# Patient Record
Sex: FEMALE | Race: WHITE | Employment: FULL TIME | URBAN - NONMETROPOLITAN AREA
[De-identification: names, ages, dates, MRNs, and addresses within clinical notes are randomized per-mention and may not be internally consistent; named-entity substitution may affect disease eponyms.]

---

## 2016-08-26 LAB
CHOLESTEROL, TOTAL: 132 MG/DL
CHOLESTEROL/HDL RATIO: 2.5
HDLC SERPL-MCNC: 52 MG/DL (ref 35–70)
LDL CHOLESTEROL CALCULATED: 61.8 MG/DL (ref 0–160)
TRIGL SERPL-MCNC: 91 MG/DL
VLDLC SERPL CALC-MCNC: 18 MG/DL

## 2016-09-02 LAB — HBA1C MFR BLD: 5.9 %

## 2017-02-27 LAB
BUN BLDV-MCNC: 23 MG/DL
CALCIUM SERPL-MCNC: 10.3 MG/DL
CHLORIDE BLD-SCNC: 102 MMOL/L
CO2: 27 MMOL/L
CREAT SERPL-MCNC: 1.1 MG/DL
GFR CALCULATED: NORMAL
GLUCOSE BLD-MCNC: 146 MG/DL
POTASSIUM SERPL-SCNC: 4.6 MMOL/L
SODIUM BLD-SCNC: 141 MMOL/L

## 2017-05-09 VITALS
WEIGHT: 134 LBS | SYSTOLIC BLOOD PRESSURE: 128 MMHG | HEART RATE: 72 BPM | DIASTOLIC BLOOD PRESSURE: 80 MMHG | BODY MASS INDEX: 26.31 KG/M2 | HEIGHT: 60 IN

## 2017-05-09 DIAGNOSIS — E11.9 TYPE 2 DIABETES MELLITUS WITHOUT COMPLICATION, UNSPECIFIED LONG TERM INSULIN USE STATUS: ICD-10-CM

## 2017-05-09 DIAGNOSIS — I10 ESSENTIAL HYPERTENSION: ICD-10-CM

## 2017-05-09 DIAGNOSIS — E78.2 MIXED HYPERLIPIDEMIA: ICD-10-CM

## 2017-05-09 RX ORDER — LISINOPRIL AND HYDROCHLOROTHIAZIDE 20; 12.5 MG/1; MG/1
1 TABLET ORAL DAILY
COMMUNITY
End: 2017-11-06 | Stop reason: SDUPTHER

## 2017-05-09 RX ORDER — MELOXICAM 15 MG/1
15 TABLET ORAL DAILY
COMMUNITY
End: 2017-06-14 | Stop reason: SDUPTHER

## 2017-05-09 RX ORDER — LORATADINE 10 MG/1
10 TABLET ORAL DAILY
COMMUNITY
End: 2020-10-29

## 2017-05-09 RX ORDER — METFORMIN HYDROCHLORIDE 750 MG/1
750 TABLET, EXTENDED RELEASE ORAL 2 TIMES DAILY
COMMUNITY
End: 2017-07-05 | Stop reason: SDUPTHER

## 2017-05-09 RX ORDER — SIMVASTATIN 40 MG
40 TABLET ORAL NIGHTLY
COMMUNITY
End: 2017-07-24 | Stop reason: SDUPTHER

## 2017-05-09 RX ORDER — OMEPRAZOLE 40 MG/1
40 CAPSULE, DELAYED RELEASE ORAL DAILY
COMMUNITY
End: 2017-05-22 | Stop reason: SDUPTHER

## 2017-05-21 DIAGNOSIS — E11.9 TYPE 2 DIABETES MELLITUS WITHOUT COMPLICATION, WITH LONG-TERM CURRENT USE OF INSULIN (HCC): ICD-10-CM

## 2017-05-21 DIAGNOSIS — E78.2 MIXED HYPERLIPIDEMIA: ICD-10-CM

## 2017-05-21 DIAGNOSIS — Z79.4 TYPE 2 DIABETES MELLITUS WITHOUT COMPLICATION, WITH LONG-TERM CURRENT USE OF INSULIN (HCC): ICD-10-CM

## 2017-05-21 DIAGNOSIS — I10 ESSENTIAL HYPERTENSION: Primary | ICD-10-CM

## 2017-05-22 ENCOUNTER — OFFICE VISIT (OUTPATIENT)
Dept: FAMILY MEDICINE CLINIC | Age: 62
End: 2017-05-22
Payer: COMMERCIAL

## 2017-05-22 VITALS
SYSTOLIC BLOOD PRESSURE: 130 MMHG | WEIGHT: 137 LBS | HEIGHT: 60 IN | DIASTOLIC BLOOD PRESSURE: 88 MMHG | HEART RATE: 68 BPM | BODY MASS INDEX: 26.9 KG/M2

## 2017-05-22 DIAGNOSIS — E78.2 MIXED HYPERLIPIDEMIA: ICD-10-CM

## 2017-05-22 DIAGNOSIS — I10 ESSENTIAL HYPERTENSION: ICD-10-CM

## 2017-05-22 DIAGNOSIS — Z79.4 TYPE 2 DIABETES MELLITUS WITHOUT COMPLICATION, WITH LONG-TERM CURRENT USE OF INSULIN (HCC): Primary | ICD-10-CM

## 2017-05-22 DIAGNOSIS — E11.9 TYPE 2 DIABETES MELLITUS WITHOUT COMPLICATION, WITH LONG-TERM CURRENT USE OF INSULIN (HCC): Primary | ICD-10-CM

## 2017-05-22 DIAGNOSIS — E78.00 PURE HYPERCHOLESTEROLEMIA: Primary | ICD-10-CM

## 2017-05-22 LAB — HBA1C MFR BLD: 5.8 %

## 2017-05-22 PROCEDURE — G8427 DOCREV CUR MEDS BY ELIG CLIN: HCPCS | Performed by: FAMILY MEDICINE

## 2017-05-22 PROCEDURE — 99214 OFFICE O/P EST MOD 30 MIN: CPT | Performed by: FAMILY MEDICINE

## 2017-05-22 PROCEDURE — 3014F SCREEN MAMMO DOC REV: CPT | Performed by: FAMILY MEDICINE

## 2017-05-22 PROCEDURE — 83036 HEMOGLOBIN GLYCOSYLATED A1C: CPT | Performed by: FAMILY MEDICINE

## 2017-05-22 PROCEDURE — 3017F COLORECTAL CA SCREEN DOC REV: CPT | Performed by: FAMILY MEDICINE

## 2017-05-22 PROCEDURE — 3044F HG A1C LEVEL LT 7.0%: CPT | Performed by: FAMILY MEDICINE

## 2017-05-22 PROCEDURE — 1036F TOBACCO NON-USER: CPT | Performed by: FAMILY MEDICINE

## 2017-05-22 PROCEDURE — G8419 CALC BMI OUT NRM PARAM NOF/U: HCPCS | Performed by: FAMILY MEDICINE

## 2017-05-22 RX ORDER — OMEPRAZOLE 40 MG/1
40 CAPSULE, DELAYED RELEASE ORAL DAILY
Qty: 90 CAPSULE | Refills: 3 | Status: SHIPPED | OUTPATIENT
Start: 2017-05-22 | End: 2018-05-18 | Stop reason: SDUPTHER

## 2017-05-22 RX ORDER — MAGNESIUM OXIDE 400 MG/1
400 TABLET ORAL DAILY
COMMUNITY
End: 2021-01-20 | Stop reason: ALTCHOICE

## 2017-05-22 ASSESSMENT — PATIENT HEALTH QUESTIONNAIRE - PHQ9
SUM OF ALL RESPONSES TO PHQ9 QUESTIONS 1 & 2: 0
1. LITTLE INTEREST OR PLEASURE IN DOING THINGS: 0
2. FEELING DOWN, DEPRESSED OR HOPELESS: 0
SUM OF ALL RESPONSES TO PHQ QUESTIONS 1-9: 0

## 2017-05-23 ASSESSMENT — ENCOUNTER SYMPTOMS
BLOOD IN STOOL: 0
ABDOMINAL PAIN: 0
WHEEZING: 0
DIARRHEA: 0
CONSTIPATION: 0
SHORTNESS OF BREATH: 0
CHEST TIGHTNESS: 0

## 2017-06-14 RX ORDER — MELOXICAM 15 MG/1
TABLET ORAL
Qty: 90 TABLET | Refills: 3 | Status: SHIPPED | OUTPATIENT
Start: 2017-06-14 | End: 2018-06-09 | Stop reason: SDUPTHER

## 2017-07-05 RX ORDER — METFORMIN HYDROCHLORIDE 750 MG/1
TABLET, EXTENDED RELEASE ORAL
Qty: 180 TABLET | Refills: 3 | Status: SHIPPED | OUTPATIENT
Start: 2017-07-05 | End: 2018-06-27 | Stop reason: SDUPTHER

## 2017-07-20 RX ORDER — PEN NEEDLE, DIABETIC 31 GX5/16"
NEEDLE, DISPOSABLE MISCELLANEOUS
Qty: 90 EACH | Refills: 1 | Status: SHIPPED | OUTPATIENT
Start: 2017-07-20 | End: 2018-04-11 | Stop reason: SDUPTHER

## 2017-07-20 RX ORDER — INSULIN DETEMIR 100 [IU]/ML
INJECTION, SOLUTION SUBCUTANEOUS
Qty: 15 ML | Refills: 1 | Status: SHIPPED | OUTPATIENT
Start: 2017-07-20 | End: 2018-04-11 | Stop reason: SDUPTHER

## 2017-07-24 DIAGNOSIS — E78.00 PURE HYPERCHOLESTEROLEMIA: Primary | ICD-10-CM

## 2017-07-25 RX ORDER — SIMVASTATIN 40 MG
40 TABLET ORAL NIGHTLY
Qty: 90 TABLET | Refills: 3 | Status: SHIPPED | OUTPATIENT
Start: 2017-07-25 | End: 2018-07-20 | Stop reason: SDUPTHER

## 2017-11-06 RX ORDER — LISINOPRIL AND HYDROCHLOROTHIAZIDE 20; 12.5 MG/1; MG/1
TABLET ORAL
Qty: 90 TABLET | Refills: 2 | Status: SHIPPED | OUTPATIENT
Start: 2017-11-06 | End: 2018-08-03 | Stop reason: SDUPTHER

## 2017-11-06 NOTE — TELEPHONE ENCOUNTER
Raf Richard is calling to request a refill on the following medication(s):  Requested Prescriptions     Pending Prescriptions Disp Refills    lisinopril-hydrochlorothiazide (PRINZIDE;ZESTORETIC) 20-12.5 MG per tablet [Pharmacy Med Name: LISINOPRIL/HCTZ TABS 20/12.5MG] 90 tablet 2     Sig: TAKE 1 TABLET DAILY       Last Visit Date (If Applicable):  8/56/2655    Next Visit Date:    11/17/2017

## 2017-11-15 LAB
CREATININE, RANDOM: 214.9 MG/DL
MICROALBUMIN UR-MCNC: 3.9 MG/DL
MICROALBUMIN/CREAT UR-RTO: 18.1 MCG/MG CR

## 2017-11-16 DIAGNOSIS — Z79.4 TYPE 2 DIABETES MELLITUS WITHOUT COMPLICATION, WITH LONG-TERM CURRENT USE OF INSULIN (HCC): ICD-10-CM

## 2017-11-16 DIAGNOSIS — E11.9 TYPE 2 DIABETES MELLITUS WITHOUT COMPLICATION, WITH LONG-TERM CURRENT USE OF INSULIN (HCC): ICD-10-CM

## 2017-11-17 ENCOUNTER — OFFICE VISIT (OUTPATIENT)
Dept: FAMILY MEDICINE CLINIC | Age: 62
End: 2017-11-17
Payer: COMMERCIAL

## 2017-11-17 VITALS
BODY MASS INDEX: 27.6 KG/M2 | WEIGHT: 139 LBS | HEART RATE: 80 BPM | SYSTOLIC BLOOD PRESSURE: 130 MMHG | DIASTOLIC BLOOD PRESSURE: 70 MMHG

## 2017-11-17 DIAGNOSIS — Z12.12 SCREENING FOR COLORECTAL CANCER: ICD-10-CM

## 2017-11-17 DIAGNOSIS — I10 ESSENTIAL HYPERTENSION: ICD-10-CM

## 2017-11-17 DIAGNOSIS — Z11.59 NEED FOR HEPATITIS C SCREENING TEST: ICD-10-CM

## 2017-11-17 DIAGNOSIS — E78.2 MIXED HYPERLIPIDEMIA: ICD-10-CM

## 2017-11-17 DIAGNOSIS — Z12.11 SCREENING FOR COLORECTAL CANCER: ICD-10-CM

## 2017-11-17 DIAGNOSIS — E11.8 TYPE 2 DIABETES MELLITUS WITH COMPLICATION, UNSPECIFIED LONG TERM INSULIN USE STATUS: Primary | ICD-10-CM

## 2017-11-17 LAB — HBA1C MFR BLD: 5.8 %

## 2017-11-17 PROCEDURE — 3017F COLORECTAL CA SCREEN DOC REV: CPT | Performed by: FAMILY MEDICINE

## 2017-11-17 PROCEDURE — 3014F SCREEN MAMMO DOC REV: CPT | Performed by: FAMILY MEDICINE

## 2017-11-17 PROCEDURE — G8484 FLU IMMUNIZE NO ADMIN: HCPCS | Performed by: FAMILY MEDICINE

## 2017-11-17 PROCEDURE — 99214 OFFICE O/P EST MOD 30 MIN: CPT | Performed by: FAMILY MEDICINE

## 2017-11-17 PROCEDURE — G8427 DOCREV CUR MEDS BY ELIG CLIN: HCPCS | Performed by: FAMILY MEDICINE

## 2017-11-17 PROCEDURE — 1036F TOBACCO NON-USER: CPT | Performed by: FAMILY MEDICINE

## 2017-11-17 PROCEDURE — 83036 HEMOGLOBIN GLYCOSYLATED A1C: CPT | Performed by: FAMILY MEDICINE

## 2017-11-17 PROCEDURE — G8417 CALC BMI ABV UP PARAM F/U: HCPCS | Performed by: FAMILY MEDICINE

## 2017-11-17 PROCEDURE — 3044F HG A1C LEVEL LT 7.0%: CPT | Performed by: FAMILY MEDICINE

## 2017-11-17 ASSESSMENT — ENCOUNTER SYMPTOMS
SHORTNESS OF BREATH: 0
DIARRHEA: 0
BLOOD IN STOOL: 0
ABDOMINAL PAIN: 0
CONSTIPATION: 0
CHEST TIGHTNESS: 0
WHEEZING: 0

## 2017-11-17 NOTE — PROGRESS NOTES
[address]  Dept: [phone]  Dept Fax: [phone]    Deja Gil is a 58 y.o. female who presents today for her medical conditions/complaints as noted below. Deja Gil is c/o of Hypertension (not checking BP, denies chest pain, palpatations, leg swelling, s.o.b); Diabetes (rarely checks BS, taking all meds as directed. denies numbness in feet); and Hyperlipidemia (recent leg cramps, seems worse now, not necessarily r/t zocor. cramps in thighs)      HPI:     HPI   Will be getting HRA labs   Diabetes  Doing well      Checking blood sugars   [x]   none    Meds   []   none Bydureon, levemir, metformin   Last hemoglobin A1C   []   none 5.8   Last eye exam   []   none A year ago with Dr Luz Marina Escudero   microalbumin   []   none Normal    Last diabetic foot exam   []   none    Endocrinologist    [x]   none    Hypoglycemic episodes    [x]   none    Complications   []   none     CAD    []   none     Hypertension  well controlled; BP: 130/70   No problems with medication side effects; tolerating well  No chest pain  No edema   Hyperlipidemia   Well controlled  Medication side effects - ? Myalgias   The 10-year CVD risk score (D'Agostino, et al., 2008) is: 12.4%    Values used to calculate the score:      Age: 58 years      Sex: Female      Diabetic: Yes      Tobacco smoker: No      Systolic Blood Pressure: 494 mmHg      Is BP treated: Yes      HDL Cholesterol: 52 mg/dL      Total Cholesterol: 132 mg/dL   Chest pain - none  Claudication - none        BP Readings from Last 3 Encounters:   11/17/17 130/70   05/22/17 130/88   09/02/16 128/80            (goal 120/80)    Past Medical History:   Diagnosis Date    Hyperlipidemia     Hypertension     Nephrolithiasis     Type 2 diabetes mellitus without complication ([facility] [address]       Past Surgical History:   Procedure Laterality Date    CHOLECYSTECTOMY       No family history on file.   Social Pneumococcal med risk  Completed       Subjective:      Review of Systems   Constitutional: Negative for appetite change, chills, fever and unexpected weight change. Respiratory: Negative for chest tightness, shortness of breath and wheezing. Cardiovascular: Negative for chest pain, palpitations and leg swelling. Gastrointestinal: Negative for abdominal pain, blood in stool, constipation and diarrhea. Genitourinary: Negative for dysuria, hematuria and urgency. Objective:     /70   Pulse 80   Wt 139 lb (63 kg)   BMI 27.60 kg/m²     Physical Exam   Constitutional: She appears well-developed and well-nourished. HENT:   Head: Normocephalic. Right Ear: Tympanic membrane normal.   Left Ear: Tympanic membrane normal.   Neck: Neck supple. No thyromegaly present. Cardiovascular: Normal rate, regular rhythm, S1 normal and S2 normal.    Pulmonary/Chest: Breath sounds normal. She has no wheezes. She has no rales. She exhibits no tenderness. Abdominal: Soft. She exhibits no abdominal bruit and no mass. There is no hepatosplenomegaly. There is no tenderness. There is no guarding. Musculoskeletal: She exhibits no edema. Lymphadenopathy:     She has no cervical adenopathy. She has no axillary adenopathy. Neurological: No cranial nerve deficit. Diabetic foot exam - normal strength; intact sensation to monofilament; normal pulses    Assessment:     1. Type 2 diabetes mellitus with complication, unspecified long term insulin use status (HCC)  POCT glycosylated hemoglobin (Hb A1C)     DIABETES FOOT EXAM   2. Need for hepatitis C screening test  Hepatitis C Antibody   3. Mixed hyperlipidemia     4. Essential hypertension     5.  Screening for colorectal cancer  External Referral To General Surgery            POC Testing Results (If Applicable):  Results for POC orders placed in visit on 11/17/17   POCT glycosylated hemoglobin (Hb A1C)   Result Value Ref Range    Hemoglobin A1C 5.8 % Plan:     Continue current meds   Will get screening colonoscopy  Will get hepatitis C screen     Orders Given:  Orders Placed This Encounter   Procedures    Hepatitis C Antibody     Standing Status:   Future     Standing Expiration Date:   11/17/2018    External Referral To General Surgery     Referral Priority:   Routine     Referral Type:   Consult for Advice and Opinion     Referral Reason:   Specialty Services Required     Referred to Provider:   Sherwin Palma MD     Requested Specialty:   General Surgery     Number of Visits Requested:   1    POCT glycosylated hemoglobin (Hb A1C)    HM DIABETES FOOT EXAM          Electronically signed by Kalpesh Gilman MD on 11/17/2017.

## 2018-05-18 DIAGNOSIS — E78.00 PURE HYPERCHOLESTEROLEMIA: ICD-10-CM

## 2018-05-18 RX ORDER — OMEPRAZOLE 40 MG/1
CAPSULE, DELAYED RELEASE ORAL
Qty: 90 CAPSULE | Refills: 3 | Status: SHIPPED | OUTPATIENT
Start: 2018-05-18 | End: 2019-05-13 | Stop reason: SDUPTHER

## 2018-06-11 RX ORDER — MELOXICAM 15 MG/1
TABLET ORAL
Qty: 90 TABLET | Refills: 3 | Status: SHIPPED | OUTPATIENT
Start: 2018-06-11 | End: 2019-06-06 | Stop reason: SDUPTHER

## 2018-06-29 RX ORDER — METFORMIN HYDROCHLORIDE 750 MG/1
TABLET, EXTENDED RELEASE ORAL
Qty: 180 TABLET | Refills: 3 | Status: SHIPPED | OUTPATIENT
Start: 2018-06-29 | End: 2019-06-24 | Stop reason: SDUPTHER

## 2018-07-20 DIAGNOSIS — E78.00 PURE HYPERCHOLESTEROLEMIA: ICD-10-CM

## 2018-07-20 RX ORDER — SIMVASTATIN 40 MG
TABLET ORAL
Qty: 90 TABLET | Refills: 3 | Status: SHIPPED | OUTPATIENT
Start: 2018-07-20 | End: 2019-07-15 | Stop reason: SDUPTHER

## 2018-08-03 DIAGNOSIS — I10 ESSENTIAL HYPERTENSION: Primary | ICD-10-CM

## 2018-08-03 RX ORDER — LISINOPRIL AND HYDROCHLOROTHIAZIDE 20; 12.5 MG/1; MG/1
TABLET ORAL
Qty: 90 TABLET | Refills: 2 | Status: SHIPPED | OUTPATIENT
Start: 2018-08-03 | End: 2019-05-07 | Stop reason: SDUPTHER

## 2018-12-28 RX ORDER — INSULIN DETEMIR 100 [IU]/ML
INJECTION, SOLUTION SUBCUTANEOUS
Qty: 15 ML | Refills: 1 | Status: SHIPPED | OUTPATIENT
Start: 2018-12-28 | End: 2019-09-18 | Stop reason: SDUPTHER

## 2019-03-25 DIAGNOSIS — Z79.4 TYPE 2 DIABETES MELLITUS WITHOUT COMPLICATION, WITH LONG-TERM CURRENT USE OF INSULIN (HCC): Primary | ICD-10-CM

## 2019-03-25 DIAGNOSIS — E11.9 TYPE 2 DIABETES MELLITUS WITHOUT COMPLICATION, WITH LONG-TERM CURRENT USE OF INSULIN (HCC): Primary | ICD-10-CM

## 2019-03-26 RX ORDER — EXENATIDE 2 MG/.65ML
INJECTION, SUSPENSION, EXTENDED RELEASE SUBCUTANEOUS
Qty: 12 EACH | Refills: 3 | Status: SHIPPED | OUTPATIENT
Start: 2019-03-26 | End: 2020-03-09

## 2019-05-07 ENCOUNTER — OFFICE VISIT (OUTPATIENT)
Dept: FAMILY MEDICINE CLINIC | Age: 64
End: 2019-05-07
Payer: COMMERCIAL

## 2019-05-07 VITALS
SYSTOLIC BLOOD PRESSURE: 136 MMHG | HEART RATE: 72 BPM | DIASTOLIC BLOOD PRESSURE: 72 MMHG | BODY MASS INDEX: 27.09 KG/M2 | OXYGEN SATURATION: 98 % | WEIGHT: 138 LBS | HEIGHT: 60 IN

## 2019-05-07 DIAGNOSIS — Z12.31 SCREENING MAMMOGRAM, ENCOUNTER FOR: ICD-10-CM

## 2019-05-07 DIAGNOSIS — I10 ESSENTIAL HYPERTENSION: ICD-10-CM

## 2019-05-07 DIAGNOSIS — D50.8 OTHER IRON DEFICIENCY ANEMIA: ICD-10-CM

## 2019-05-07 DIAGNOSIS — Z79.4 TYPE 2 DIABETES MELLITUS WITHOUT COMPLICATION, WITH LONG-TERM CURRENT USE OF INSULIN (HCC): Primary | ICD-10-CM

## 2019-05-07 DIAGNOSIS — E78.2 MIXED HYPERLIPIDEMIA: ICD-10-CM

## 2019-05-07 DIAGNOSIS — E11.9 TYPE 2 DIABETES MELLITUS WITHOUT COMPLICATION, WITH LONG-TERM CURRENT USE OF INSULIN (HCC): Primary | ICD-10-CM

## 2019-05-07 DIAGNOSIS — Z78.0 ASYMPTOMATIC MENOPAUSAL STATE: ICD-10-CM

## 2019-05-07 LAB — HBA1C MFR BLD: 5.9 %

## 2019-05-07 PROCEDURE — 3044F HG A1C LEVEL LT 7.0%: CPT | Performed by: FAMILY MEDICINE

## 2019-05-07 PROCEDURE — 83036 HEMOGLOBIN GLYCOSYLATED A1C: CPT | Performed by: FAMILY MEDICINE

## 2019-05-07 PROCEDURE — 1036F TOBACCO NON-USER: CPT | Performed by: FAMILY MEDICINE

## 2019-05-07 PROCEDURE — G8419 CALC BMI OUT NRM PARAM NOF/U: HCPCS | Performed by: FAMILY MEDICINE

## 2019-05-07 PROCEDURE — 2022F DILAT RTA XM EVC RTNOPTHY: CPT | Performed by: FAMILY MEDICINE

## 2019-05-07 PROCEDURE — G8427 DOCREV CUR MEDS BY ELIG CLIN: HCPCS | Performed by: FAMILY MEDICINE

## 2019-05-07 PROCEDURE — 3017F COLORECTAL CA SCREEN DOC REV: CPT | Performed by: FAMILY MEDICINE

## 2019-05-07 PROCEDURE — 99214 OFFICE O/P EST MOD 30 MIN: CPT | Performed by: FAMILY MEDICINE

## 2019-05-07 RX ORDER — LISINOPRIL AND HYDROCHLOROTHIAZIDE 20; 12.5 MG/1; MG/1
1 TABLET ORAL DAILY
Qty: 90 TABLET | Refills: 3 | Status: SHIPPED | OUTPATIENT
Start: 2019-05-07 | End: 2020-05-01

## 2019-05-07 ASSESSMENT — PATIENT HEALTH QUESTIONNAIRE - PHQ9
SUM OF ALL RESPONSES TO PHQ QUESTIONS 1-9: 0
1. LITTLE INTEREST OR PLEASURE IN DOING THINGS: 0
2. FEELING DOWN, DEPRESSED OR HOPELESS: 0
SUM OF ALL RESPONSES TO PHQ QUESTIONS 1-9: 0
1. LITTLE INTEREST OR PLEASURE IN DOING THINGS: 0
SUM OF ALL RESPONSES TO PHQ9 QUESTIONS 1 & 2: 0

## 2019-05-07 ASSESSMENT — ENCOUNTER SYMPTOMS
CONSTIPATION: 0
BLOOD IN STOOL: 0
ABDOMINAL PAIN: 0
DIARRHEA: 0
SHORTNESS OF BREATH: 0
COUGH: 0
WHEEZING: 0

## 2019-05-07 NOTE — PROGRESS NOTES
[address] [address] [address]  Dept: [phone]  DeptFax: [phone]    Greta Delatorre is a57 y.o. female who presents today for her medical conditions/complaints as noted below. Greta Delatorre is c/o of Hyperlipidemia (denies issues or complaints); Hypertension; and Diabetes (does not ck bs)      HPI:     HPI  Diabetes  Doing well      Checking blood sugars   [x]   none  hasn't been checking    Meds   []   none Bydureon, levemir, metformin   Last hemoglobin A1C   []   none 5.8; one year ago   Last eye exam   []   none Goes every year for eye exam with Dr Jenni Wasserman   microalbumin   []   none Normal    Last diabetic foot exam   []   none  none recent    Endocrinologist    [x]   none     Hypoglycemic episodes    [x]   none     Complications   []   none      CAD    []   none      Hypertension  well controlled; BP: 136/72   No problems with medication side effects; tolerating well  No chest pain  No edema   Remains on lisinopril HCTZ;; Cr 1.1 from hospital   Hyperlipidemia   Well controlled  Medication side effects ; none remains on zocor  No chest pain/pressure  Likes to walk   No recent lab    Health Maintenance   Colonoscopy - up to date   Mammogram - last   DEXA scan - last   Lipid profile - ordered  Glucose/diabetes screen - see above                       BP Readings from Last 3 Encounters:   19 136/72   17 130/70   17 130/88            (goal 120/80)    Past Medical History:   Diagnosis Date    Hyperlipidemia     Hypertension     Nephrolithiasis     Type 2 diabetes mellitus without complication ([facility] [address]       Past Surgical History:   Procedure Laterality Date    CHOLECYSTECTOMY       No family history on file.   Social History     Tobacco Use    Smoking status: Former Smoker     Packs/day: 0.50     Years: 4.00     Pack years: 2.00     Last attempt to quit: 1997     Years since quittin.9    Smokeless tobacco: Never Used   Substance Use Topics    Alcohol use: Yes        Current Outpatient Medications   Medication Sig Dispense Refill    BYDUREON 2 MG PEN INJECT CONTENTS OF  1 PEN (2 MG) UNDER THE SKIN ONCE A WEEK 12 each 3    LEVEMIR FLEXTOUCH 100 UNIT/ML injection pen INJECT 11 UNITS UNDER THE SKIN AT BEDTIME 15 mL 1    lisinopril-hydrochlorothiazide (PRINZIDE;ZESTORETIC) 20-12.5 MG per tablet TAKE 1 TABLET DAILY 90 tablet 2    simvastatin (ZOCOR) 40 MG tablet TAKE 1 TABLET NIGHTLY 90 tablet 3    metFORMIN (GLUCOPHAGE-XR) 750 MG extended release tablet TAKE 1 TABLET TWICE A  tablet 3    meloxicam (MOBIC) 15 MG tablet TAKE 1 TABLET DAILY 90 tablet 3    omeprazole (PRILOSEC) 40 MG delayed release capsule TAKE 1 CAPSULE DAILY 90 capsule 3    Insulin Pen Needle (B-D ULTRAFINE III SHORT PEN) 31G X 8 MM MISC USE UNDER THE SKIN DAILY 100 each 3    aspirin 81 MG tablet Take 81 mg by mouth daily      Multiple Vitamins-Minerals (MULTIVITAMIN & MINERAL PO) Take by mouth      magnesium oxide (MAG-OX) 400 MG tablet Take 400 mg by mouth daily      loratadine (CLARITIN) 10 MG tablet Take 10 mg by mouth daily       No current facility-administered medications for this visit.       No Known Allergies    Health Maintenance   Topic Date Due    Hepatitis C screen  1955    DTaP/Tdap/Td vaccine (1 - Tdap) 09/13/1974    Shingles Vaccine (1 of 2) 09/13/2005    Lipid screen  08/26/2017    Cervical cancer screen  09/30/2017    Potassium monitoring  02/27/2018    Creatinine monitoring  02/27/2018    Diabetic microalbuminuria test  11/15/2018    Diabetic foot exam  11/17/2018    A1C test (Diabetic or Prediabetic)  11/17/2018    Breast cancer screen  11/25/2018    HIV screen  11/17/2027 (Originally 9/13/1970)    Diabetic retinal exam  07/13/2019    Flu vaccine (Season Ended) 09/01/2019    Colon cancer screen colonoscopy  01/08/2028    Pneumococcal 0-64 years Vaccine  Completed       Lab Results Component Value Date    K 4.6 02/27/2017    CREATININE 1.1 02/27/2017    LABA1C 5.9 05/07/2019    GLUCOSE 146 02/27/2017    CALCIUM 10.3 02/27/2017      Lab Results   Component Value Date    CHOL 132 08/26/2016    TRIG 91 08/26/2016    HDL 52 08/26/2016       Subjective:      Review of Systems   Constitutional: Negative for appetite change, chills, fever and unexpected weight change. HENT: Negative. Eyes: Negative for visual disturbance. Respiratory: Negative for cough, shortness of breath and wheezing. Cardiovascular: Negative for chest pain, palpitations and leg swelling. Gastrointestinal: Negative for abdominal pain, blood in stool, constipation and diarrhea. Endocrine: Negative for polydipsia, polyphagia and polyuria. Genitourinary: Negative for dysuria, hematuria and urgency. Neurological: Negative for numbness. Psychiatric/Behavioral: Negative for dysphoric mood. Objective:     /72   Pulse 72   Ht 4' 11.5\" (1.511 m)   Wt 138 lb (62.6 kg)   SpO2 98%   BMI 27.41 kg/m²     Physical Exam   Constitutional: She appears well-developed and well-nourished. HENT:   Right Ear: Tympanic membrane normal.   Left Ear: Tympanic membrane normal.   Mouth/Throat: Oropharynx is clear and moist and mucous membranes are normal.   Neck: Neck supple. No thyromegaly present. Cardiovascular: Normal rate, regular rhythm, S1 normal and S2 normal.   Pulmonary/Chest: Breath sounds normal. She has no wheezes. She has no rales. She exhibits no tenderness. Abdominal: Soft. She exhibits no abdominal bruit and no mass. There is no hepatosplenomegaly. There is no tenderness. There is no guarding. Musculoskeletal: She exhibits no edema. Lymphadenopathy:     She has no cervical adenopathy. She has no axillary adenopathy. Neurological: No cranial nerve deficit.        Diabetic Foot Exam    Pulses:  normal,   Edema: negative  Skin: normal    Sensory exam  Monofilament Sensation:  normal  (minimum of 5 random plantar locations tested, avoid callous areas - > 1 area with absence of sensation is + for neuropathy)          Assessment:      Diagnosis Orders   1. Type 2 diabetes mellitus without complication, with long-term current use of insulin ([facility])   DIABETES FOOT EXAM    POCT glycosylated hemoglobin (Hb A1C)    Microalbumin, Ur    Comprehensive Metabolic Panel, Fasting   2. Essential hypertension     3. Mixed hyperlipidemia  Lipid Panel   4. Screening mammogram, encounter for  CHAD DIGITAL SCREEN W CAD BILATERAL   5. Asymptomatic menopausal state  DEXA AXIAL SKELETON W VERTEBRAL FX ASST   6. Other iron deficiency anemia  Iron And TIBC    Ferritin    Vitamin B12 & Folate            POC Testing Results (If Applicable):  Results for POC orders placed in visit on 05/07/19   POCT glycosylated hemoglobin (Hb A1C)   Result Value Ref Range    Hemoglobin A1C 5.9 %       Plan: Will get labs including her lipids and microalbumin. Check iron levels B-12 folate. Treat if necessary. Remembering that she did have a colonoscopy last year. We will also order her mammogram and bone density testing. Lisinopril hydrochlorothiazide is refilled.   Recheck in 6 months sooner if any problems    Orders Given:  Orders Placed This Encounter   Procedures    CHAD DIGITAL SCREEN W CAD BILATERAL     Standing Status:   Future     Standing Expiration Date:   7/6/2020    DEXA AXIAL SKELETON W VERTEBRAL FX ASST     Standing Status:   Future     Standing Expiration Date:   5/7/2020     Order Specific Question:   Reason for exam:     Answer:   screening    Lipid Panel     Standing Status:   Future     Standing Expiration Date:   5/6/2020     Order Specific Question:   Is Patient Fasting?/# of Hours     Answer:   Yes, 12 hours    Microalbumin, Ur     Standing Status:   Future     Standing Expiration Date:   5/6/2020    Comprehensive Metabolic Panel, Fasting     Standing Status:   Future     Standing Expiration Date:   5/6/2020   Lloyd Taylor Iron And TIBC     Standing Status:   Future     Standing Expiration Date:   5/7/2020     Order Specific Question:   Is Patient Fasting? Answer:   yes     Order Specific Question:   No of Hours? Answer:   12 hours    Ferritin     Standing Status:   Future     Standing Expiration Date:   5/7/2020    Vitamin B12 & Folate     Standing Status:   Future     Standing Expiration Date:   5/7/2020    POCT glycosylated hemoglobin (Hb A1C)    HM DIABETES FOOT EXAM     Prescriptions:    No orders of the defined types were placed in this encounter. No follow-ups on file. Electronically signed by Kenton Austin MD on5/7/2019. **This report has been created using voice recognition software. It may contain minor errors which are inherent in voice recognition technology. **

## 2019-05-13 DIAGNOSIS — E78.00 PURE HYPERCHOLESTEROLEMIA: ICD-10-CM

## 2019-05-13 RX ORDER — OMEPRAZOLE 40 MG/1
CAPSULE, DELAYED RELEASE ORAL
Qty: 90 CAPSULE | Refills: 3 | Status: SHIPPED | OUTPATIENT
Start: 2019-05-13 | End: 2020-05-08

## 2019-05-13 NOTE — TELEPHONE ENCOUNTER
Ada Hassan is calling to request a refill on the following medication(s):  Requested Prescriptions     Pending Prescriptions Disp Refills    omeprazole (PRILOSEC) 40 MG delayed release capsule [Pharmacy Med Name: OMEPRAZOLE DR CAPS 40MG] 90 capsule 3     Sig: TAKE 1 CAPSULE DAILY       Last Visit Date (If Applicable):  1/2/5279    Next Visit Date:    11/11/2019

## 2019-05-14 DIAGNOSIS — Z79.4 TYPE 2 DIABETES MELLITUS WITHOUT COMPLICATION, WITH LONG-TERM CURRENT USE OF INSULIN (HCC): Primary | ICD-10-CM

## 2019-05-14 DIAGNOSIS — E11.9 TYPE 2 DIABETES MELLITUS WITHOUT COMPLICATION, WITH LONG-TERM CURRENT USE OF INSULIN (HCC): Primary | ICD-10-CM

## 2019-05-15 NOTE — TELEPHONE ENCOUNTER
Bart Kay is calling to request a refill on the following medication(s):  Requested Prescriptions     Pending Prescriptions Disp Refills    Insulin Pen Needle (B-D ULTRAFINE III SHORT PEN) 31G X 8 MM MISC [Pharmacy Med Name: BD PEN CECILY UF SHORT 8MM 90S 31G5/16] 90 each 3     Sig: INJECT UNDER THE SKIN DAILY       Last Visit Date (If Applicable):  8/7/7969    Next Visit Date:    11/11/2019

## 2019-05-17 LAB
% SATURATION: 21 % (ref 15–38)
A/G RATIO: 1.7 RATIO
AGE FOR GFR: 63
ALBUMIN: 4.7 G/DL (ref 3.5–5)
ALK PHOSPHATASE: 62 UNITS/L (ref 38–126)
ALT SERPL-CCNC: 27 UNITS/L (ref 9–52)
ANION GAP SERPL CALCULATED.3IONS-SCNC: 14 MMOL/L
AST SERPL-CCNC: 26 UNITS/L (ref 14–36)
BILIRUB SERPL-MCNC: 0.5 MG/DL (ref 0.2–1.3)
BUN BLDV-MCNC: 23 MG/DL (ref 7–17)
CALCIUM SERPL-MCNC: 10.6 MG/DL (ref 8.4–10.2)
CHLORIDE BLD-SCNC: 100 MMOL/L (ref 98–120)
CHOLESTEROL/HDL RATIO: 2.8 RATIO (ref 0–4.5)
CHOLESTEROL: 152 MG/DL (ref 50–200)
CO2: 29 MMOL/L (ref 22–31)
CREAT SERPL-MCNC: 0.9 MG/DL (ref 0.5–1)
CREATININE, RANDOM: 129.4 MG/DL (ref 20–370)
EGFR BF: 77 ML/MIN/1.73 M2
EGFR BM: 103 ML/MIN/1.73 M2
EGFR WF: 63 ML/MIN/1.73 M2
EGFR WM: 85 ML/MIN/1.73 M2
FERRITIN: 9 NG/ML (ref 10–28)
FOLATE: >20 NG/ML (ref 2.8–20)
GLOBULIN: 2.8 G/DL
GLUCOSE: 106 MG/DL (ref 65–105)
HDL, DIRECT: 54 MG/DL (ref 36–68)
IRON: 86 MG/DL (ref 37–170)
LDL CHOLESTEROL CALCULATED: 77.8 MG/DL (ref 0–160)
MICROALBUMIN UR-MCNC: 2.2 MG/DL (ref 0–1.7)
MICROALBUMIN/CREAT UR-RTO: 17 MCG/MG CR
POTASSIUM SERPL-SCNC: 4.7 MMOL/L (ref 3.6–5)
SODIUM BLD-SCNC: 138 MMOL/L (ref 135–145)
TOTAL IRON BINDING CAPACITY: 418 MG/DL (ref 261–497)
TOTAL PROTEIN: 7.5 G/DL (ref 6.3–8.2)
TRIGL SERPL-MCNC: 101 MG/DL (ref 10–250)
VITAMIN B-12: 497 PG/ML (ref 239–931)
VLDLC SERPL CALC-MCNC: 20 MG/DL (ref 0–40)

## 2019-06-06 DIAGNOSIS — M19.90 ARTHRITIS: Primary | ICD-10-CM

## 2019-06-06 RX ORDER — MELOXICAM 15 MG/1
TABLET ORAL
Qty: 90 TABLET | Refills: 3 | Status: SHIPPED | OUTPATIENT
Start: 2019-06-06 | End: 2020-06-01 | Stop reason: SDUPTHER

## 2019-06-06 NOTE — TELEPHONE ENCOUNTER
Bran Mirza is requesting a refill on the following medication(s):  Requested Prescriptions     Pending Prescriptions Disp Refills    meloxicam (MOBIC) 15 MG tablet [Pharmacy Med Name: MELOXICAM TABS 15MG] 90 tablet 3     Sig: TAKE 1 TABLET DAILY       Last Visit Date (If Applicable):  9/8/7139    Next Visit Date:    11/11/2019

## 2019-06-24 DIAGNOSIS — E11.9 TYPE 2 DIABETES MELLITUS WITHOUT COMPLICATION, WITH LONG-TERM CURRENT USE OF INSULIN (HCC): Primary | ICD-10-CM

## 2019-06-24 DIAGNOSIS — Z79.4 TYPE 2 DIABETES MELLITUS WITHOUT COMPLICATION, WITH LONG-TERM CURRENT USE OF INSULIN (HCC): Primary | ICD-10-CM

## 2019-06-24 RX ORDER — METFORMIN HYDROCHLORIDE 750 MG/1
TABLET, EXTENDED RELEASE ORAL
Qty: 180 TABLET | Refills: 3 | Status: SHIPPED | OUTPATIENT
Start: 2019-06-24 | End: 2020-06-18 | Stop reason: SDUPTHER

## 2019-07-15 DIAGNOSIS — E78.00 PURE HYPERCHOLESTEROLEMIA: ICD-10-CM

## 2019-07-15 RX ORDER — SIMVASTATIN 40 MG
TABLET ORAL
Qty: 90 TABLET | Refills: 3 | Status: SHIPPED | OUTPATIENT
Start: 2019-07-15 | End: 2020-07-14 | Stop reason: SDUPTHER

## 2019-09-18 DIAGNOSIS — Z79.4 TYPE 2 DIABETES MELLITUS WITHOUT COMPLICATION, WITH LONG-TERM CURRENT USE OF INSULIN (HCC): Primary | ICD-10-CM

## 2019-09-18 DIAGNOSIS — E11.9 TYPE 2 DIABETES MELLITUS WITHOUT COMPLICATION, WITH LONG-TERM CURRENT USE OF INSULIN (HCC): Primary | ICD-10-CM

## 2019-09-18 RX ORDER — INSULIN DETEMIR 100 [IU]/ML
INJECTION, SOLUTION SUBCUTANEOUS
Qty: 15 ML | Refills: 2 | Status: SHIPPED | OUTPATIENT
Start: 2019-09-18 | End: 2020-10-09 | Stop reason: SDUPTHER

## 2019-09-18 NOTE — TELEPHONE ENCOUNTER
Sommer Lerner is calling to request a refill on the following medication(s):  Requested Prescriptions     Pending Prescriptions Disp Refills    LEVEMIR FLEXTOUCH 100 UNIT/ML injection pen [Pharmacy Med Name: Eli Tucker PEN 3ML 5'S 100U/ML] 15 mL 2     Sig: INJECT 11 UNITS UNDER THE SKIN AT BEDTIME       Last Visit Date (If Applicable):  7/9/6955    Next Visit Date:    11/11/2019

## 2019-10-22 LAB
CHOLESTEROL, TOTAL: 141 MG/DL
CHOLESTEROL/HDL RATIO: 3.4
GLUCOSE FASTING: 100 MG/DL
HDLC SERPL-MCNC: 41 MG/DL (ref 35–70)
LDL CHOLESTEROL CALCULATED: 66.8 MG/DL (ref 0–160)
TRIGL SERPL-MCNC: 166 MG/DL
VLDLC SERPL CALC-MCNC: 33 MG/DL

## 2020-02-20 ENCOUNTER — TELEPHONE (OUTPATIENT)
Dept: FAMILY MEDICINE CLINIC | Age: 65
End: 2020-02-20

## 2020-03-09 RX ORDER — EXENATIDE 2 MG/.65ML
INJECTION, SUSPENSION, EXTENDED RELEASE SUBCUTANEOUS
Qty: 12 EACH | Refills: 3 | Status: SHIPPED | OUTPATIENT
Start: 2020-03-09 | End: 2021-02-01 | Stop reason: SDUPTHER

## 2020-03-10 ENCOUNTER — HOSPITAL ENCOUNTER (OUTPATIENT)
Age: 65
Setting detail: SPECIMEN
Discharge: HOME | End: 2020-03-10
Payer: COMMERCIAL

## 2020-03-10 ENCOUNTER — OFFICE VISIT (OUTPATIENT)
Dept: FAMILY MEDICINE CLINIC | Age: 65
End: 2020-03-10
Payer: COMMERCIAL

## 2020-03-10 VITALS
HEART RATE: 79 BPM | BODY MASS INDEX: 29.19 KG/M2 | OXYGEN SATURATION: 95 % | DIASTOLIC BLOOD PRESSURE: 80 MMHG | WEIGHT: 147 LBS | SYSTOLIC BLOOD PRESSURE: 134 MMHG

## 2020-03-10 LAB
ABSOLUTE EOS #: 0.34 K/UL (ref 0–0.44)
ABSOLUTE IMMATURE GRANULOCYTE: 0.03 K/UL (ref 0–0.3)
ABSOLUTE LYMPH #: 2.65 K/UL (ref 1.1–3.7)
ABSOLUTE MONO #: 0.79 K/UL (ref 0.1–1.2)
ALBUMIN SERPL-MCNC: 4.8 G/DL (ref 3.5–5.2)
ALBUMIN/GLOBULIN RATIO: 1.7 (ref 1–2.5)
ALP BLD-CCNC: 83 U/L (ref 35–104)
ALT SERPL-CCNC: 18 U/L (ref 5–33)
ANION GAP SERPL CALCULATED.3IONS-SCNC: 15 MMOL/L (ref 9–17)
AST SERPL-CCNC: 22 U/L
BASOPHILS # BLD: 1 % (ref 0–2)
BASOPHILS ABSOLUTE: 0.05 K/UL (ref 0–0.2)
BILIRUB SERPL-MCNC: 0.2 MG/DL (ref 0.3–1.2)
BUN BLDV-MCNC: 24 MG/DL (ref 8–23)
BUN/CREAT BLD: 22 (ref 9–20)
CALCIUM SERPL-MCNC: 10 MG/DL (ref 8.6–10.4)
CHLORIDE BLD-SCNC: 99 MMOL/L (ref 98–107)
CO2: 25 MMOL/L (ref 20–31)
CREAT SERPL-MCNC: 1.07 MG/DL (ref 0.5–0.9)
CREATININE URINE: 95.8 MG/DL (ref 28–217)
DIFFERENTIAL TYPE: ABNORMAL
EOSINOPHILS RELATIVE PERCENT: 3 % (ref 1–4)
GFR AFRICAN AMERICAN: >60 ML/MIN
GFR NON-AFRICAN AMERICAN: 52 ML/MIN
GFR SERPL CREATININE-BSD FRML MDRD: ABNORMAL ML/MIN/{1.73_M2}
GFR SERPL CREATININE-BSD FRML MDRD: ABNORMAL ML/MIN/{1.73_M2}
GLUCOSE FASTING: 133 MG/DL (ref 70–99)
HBA1C MFR BLD: 7.1 %
HCT VFR BLD CALC: 34.6 % (ref 36.3–47.1)
HEMOGLOBIN: 11.3 G/DL (ref 11.9–15.1)
IMMATURE GRANULOCYTES: 0 %
LYMPHOCYTES # BLD: 26 % (ref 24–43)
MCH RBC QN AUTO: 28.8 PG (ref 25.2–33.5)
MCHC RBC AUTO-ENTMCNC: 32.7 G/DL (ref 25.2–33.5)
MCV RBC AUTO: 88 FL (ref 82.6–102.9)
MICROALBUMIN/CREAT 24H UR: 14 MG/L
MICROALBUMIN/CREAT UR-RTO: 15 MCG/MG CREAT
MONOCYTES # BLD: 8 % (ref 3–12)
NRBC AUTOMATED: 0 PER 100 WBC
PDW BLD-RTO: 13.4 % (ref 11.8–14.4)
PLATELET # BLD: 341 K/UL (ref 138–453)
PLATELET ESTIMATE: ABNORMAL
PMV BLD AUTO: 12.1 FL (ref 8.1–13.5)
POTASSIUM SERPL-SCNC: 4.1 MMOL/L (ref 3.7–5.3)
RBC # BLD: 3.93 M/UL (ref 3.95–5.11)
RBC # BLD: ABNORMAL 10*6/UL
SEG NEUTROPHILS: 62 % (ref 36–65)
SEGMENTED NEUTROPHILS ABSOLUTE COUNT: 6.41 K/UL (ref 1.5–8.1)
SODIUM BLD-SCNC: 139 MMOL/L (ref 135–144)
TOTAL PROTEIN: 7.7 G/DL (ref 6.4–8.3)
WBC # BLD: 10.3 K/UL (ref 3.5–11.3)
WBC # BLD: ABNORMAL 10*3/UL

## 2020-03-10 PROCEDURE — 82728 ASSAY OF FERRITIN: CPT

## 2020-03-10 PROCEDURE — 83550 IRON BINDING TEST: CPT

## 2020-03-10 PROCEDURE — 83036 HEMOGLOBIN GLYCOSYLATED A1C: CPT | Performed by: FAMILY MEDICINE

## 2020-03-10 PROCEDURE — 99214 OFFICE O/P EST MOD 30 MIN: CPT | Performed by: FAMILY MEDICINE

## 2020-03-10 PROCEDURE — 82043 UR ALBUMIN QUANTITATIVE: CPT

## 2020-03-10 PROCEDURE — 85025 COMPLETE CBC W/AUTO DIFF WBC: CPT

## 2020-03-10 PROCEDURE — 2022F DILAT RTA XM EVC RTNOPTHY: CPT | Performed by: FAMILY MEDICINE

## 2020-03-10 PROCEDURE — 1036F TOBACCO NON-USER: CPT | Performed by: FAMILY MEDICINE

## 2020-03-10 PROCEDURE — 3051F HG A1C>EQUAL 7.0%<8.0%: CPT | Performed by: FAMILY MEDICINE

## 2020-03-10 PROCEDURE — 3017F COLORECTAL CA SCREEN DOC REV: CPT | Performed by: FAMILY MEDICINE

## 2020-03-10 PROCEDURE — 36415 COLL VENOUS BLD VENIPUNCTURE: CPT

## 2020-03-10 PROCEDURE — G8419 CALC BMI OUT NRM PARAM NOF/U: HCPCS | Performed by: FAMILY MEDICINE

## 2020-03-10 PROCEDURE — 80053 COMPREHEN METABOLIC PANEL: CPT

## 2020-03-10 PROCEDURE — 82570 ASSAY OF URINE CREATININE: CPT

## 2020-03-10 PROCEDURE — 83540 ASSAY OF IRON: CPT

## 2020-03-10 PROCEDURE — G8484 FLU IMMUNIZE NO ADMIN: HCPCS | Performed by: FAMILY MEDICINE

## 2020-03-10 PROCEDURE — G8427 DOCREV CUR MEDS BY ELIG CLIN: HCPCS | Performed by: FAMILY MEDICINE

## 2020-03-10 ASSESSMENT — PATIENT HEALTH QUESTIONNAIRE - PHQ9
SUM OF ALL RESPONSES TO PHQ QUESTIONS 1-9: 0
SUM OF ALL RESPONSES TO PHQ QUESTIONS 1-9: 0
2. FEELING DOWN, DEPRESSED OR HOPELESS: 0
SUM OF ALL RESPONSES TO PHQ9 QUESTIONS 1 & 2: 0
1. LITTLE INTEREST OR PLEASURE IN DOING THINGS: 0

## 2020-03-10 ASSESSMENT — ENCOUNTER SYMPTOMS
CONSTIPATION: 0
WHEEZING: 0
ABDOMINAL PAIN: 0
COUGH: 0
DIARRHEA: 0
BLOOD IN STOOL: 0
SHORTNESS OF BREATH: 0

## 2020-03-10 NOTE — PROGRESS NOTES
[address] [address] [address]  Dept: [phone]  DeptFax: [phone]    Bari Ortega is a59 y.o. female who presents today for her medical conditions/complaints as noted below. Bari Ortega is c/o of 6 Month Follow-Up (denies any issues or complaints); Diabetes; Hyperlipidemia; and Hypertension      HPI:     HPI    Diabetes  HbA1c in May 2019 was 5.9; today's is 7.1   Checking?- doesn't   Eye exam ; November ; Dr Fatemeh Berman- no retinopathy  Meds include Levemir 11 units, Bydureon, metformin? Hypertension  Doesn't really check ; 134/80 for us today   No problems with medication side effects; tolerating well  No chest pain  No edema   Remains on lisinopril HCTZ;      Hyperlipidemia   Her last check in May total cholesterol was 154, HDL 54, LDL 77- had a repeat level though for work in 2019. Not on chart   Medication side effects ; none remains on zocor  No chest pain/pressure      ? Iron deficiency. Low serum ferritin a year ago. B12 and folate normal.     osteopenia  T score -1.2020  Decline for ; only takes a multivitamin     Abnormal mammogram  2020; left breast; had a compression view of left breast; did not need ultrasound then; no report found      BP Readings from Last 3 Encounters:   03/10/20 134/80   19 136/72   17 130/70            (goal 120/80)    Past Medical History:   Diagnosis Date    Hyperlipidemia     Hypertension     Nephrolithiasis     Type 2 diabetes mellitus without complication [facility])       Past Surgical History:   Procedure Laterality Date    CHOLECYSTECTOMY      COLONOSCOPY  2018    Dr Vásquez Nickel- negative/normal      No family history on file.   Social History     Tobacco Use    Smoking status: Former Smoker     Packs/day: 0.50     Years: 4.00     Pack years: 2.00     Last attempt to quit: 1997     Years since quittin.8    Smokeless tobacco: Never Used Vaccine  Completed    Hepatitis A vaccine  Aged Out    Hib vaccine  Aged Out    Meningococcal (ACWY) vaccine  Aged Out       Lab Results   Component Value Date    K 4.7 05/17/2019    CREATININE 0.9 05/17/2019    AST 26 05/17/2019    ALT 27 05/17/2019    LABA1C 7.1 03/10/2020    GLUCOSE 106 05/17/2019    CALCIUM 10.6 05/17/2019    PKOWYTMZ84 497 05/17/2019    FERRITIN 9 05/17/2019    TIBC 418 05/17/2019    IRON 86 05/17/2019      Lab Results   Component Value Date    CHOL 152 05/17/2019    CHOL 132 08/26/2016    TRIG 101 05/17/2019    HDL 52 08/26/2016       Subjective:      Review of Systems   Constitutional: Negative for appetite change, chills, fever and unexpected weight change. HENT: Negative. Eyes: Negative for visual disturbance. Respiratory: Negative for cough, shortness of breath and wheezing. Cardiovascular: Negative for chest pain, palpitations and leg swelling. Gastrointestinal: Negative for abdominal pain, blood in stool, constipation and diarrhea. Endocrine: Negative for polydipsia, polyphagia and polyuria. Genitourinary: Negative for dysuria, hematuria and urgency. Neurological: Negative for numbness. Psychiatric/Behavioral: Negative for dysphoric mood. Objective:     /80   Pulse 79   Wt 147 lb (66.7 kg)   SpO2 95%   BMI 29.19 kg/m²     Physical Exam  Constitutional:       Appearance: She is well-developed. HENT:      Right Ear: Tympanic membrane normal.      Left Ear: Tympanic membrane normal.   Neck:      Musculoskeletal: Neck supple. Thyroid: No thyromegaly. Cardiovascular:      Rate and Rhythm: Normal rate and regular rhythm. Heart sounds: S1 normal and S2 normal.   Pulmonary:      Breath sounds: Normal breath sounds. No wheezing or rales. Chest:      Chest wall: No tenderness. Abdominal:      General: There is no abdominal bruit. Palpations: Abdomen is soft. There is no mass. Tenderness: There is no abdominal tenderness.  There is Order Specific Question:   No of Hours? Answer:   8    Ferritin     Standing Status:   Future     Standing Expiration Date:   3/10/2021    POCT glycosylated hemoglobin (Hb A1C)     Prescriptions:    No orders of the defined types were placed in this encounter. Return in about 1 year (around 3/10/2021). Electronically signed by Pedrito Soto MD on3/10/2020. **This report has been created using voice recognition software. It may contain minor errors which are inherent in voice recognition technology. **

## 2020-03-11 LAB
FERRITIN: 11 UG/L (ref 13–150)
IRON SATURATION: 10 % (ref 20–55)
IRON: 43 UG/DL (ref 37–145)
TOTAL IRON BINDING CAPACITY: 426 UG/DL (ref 250–450)
UNSATURATED IRON BINDING CAPACITY: 383 UG/DL (ref 112–347)

## 2020-04-04 ENCOUNTER — OFFICE VISIT (OUTPATIENT)
Dept: PRIMARY CARE CLINIC | Age: 65
End: 2020-04-04
Payer: COMMERCIAL

## 2020-04-04 ENCOUNTER — CLINICAL DOCUMENTATION (OUTPATIENT)
Dept: INTERNAL MEDICINE | Age: 65
End: 2020-04-04

## 2020-04-04 VITALS
WEIGHT: 145 LBS | SYSTOLIC BLOOD PRESSURE: 112 MMHG | HEART RATE: 74 BPM | TEMPERATURE: 98.2 F | DIASTOLIC BLOOD PRESSURE: 78 MMHG | BODY MASS INDEX: 28.8 KG/M2 | OXYGEN SATURATION: 98 %

## 2020-04-04 PROCEDURE — G8419 CALC BMI OUT NRM PARAM NOF/U: HCPCS | Performed by: FAMILY MEDICINE

## 2020-04-04 PROCEDURE — 99214 OFFICE O/P EST MOD 30 MIN: CPT | Performed by: FAMILY MEDICINE

## 2020-04-04 PROCEDURE — 1036F TOBACCO NON-USER: CPT | Performed by: FAMILY MEDICINE

## 2020-04-04 PROCEDURE — G8427 DOCREV CUR MEDS BY ELIG CLIN: HCPCS | Performed by: FAMILY MEDICINE

## 2020-04-04 PROCEDURE — 20552 NJX 1/MLT TRIGGER POINT 1/2: CPT | Performed by: FAMILY MEDICINE

## 2020-04-04 PROCEDURE — 3017F COLORECTAL CA SCREEN DOC REV: CPT | Performed by: FAMILY MEDICINE

## 2020-04-04 RX ORDER — ONDANSETRON 4 MG/1
4 TABLET, ORALLY DISINTEGRATING ORAL ONCE
Status: COMPLETED | OUTPATIENT
Start: 2020-04-04 | End: 2020-04-04

## 2020-04-04 RX ORDER — KETOROLAC TROMETHAMINE 30 MG/ML
60 INJECTION, SOLUTION INTRAMUSCULAR; INTRAVENOUS ONCE
Status: COMPLETED | OUTPATIENT
Start: 2020-04-04 | End: 2020-04-04

## 2020-04-04 RX ORDER — TRIAMCINOLONE ACETONIDE 40 MG/ML
20 INJECTION, SUSPENSION INTRA-ARTICULAR; INTRAMUSCULAR ONCE
Status: COMPLETED | OUTPATIENT
Start: 2020-04-04 | End: 2020-04-04

## 2020-04-04 RX ORDER — ONDANSETRON 4 MG/1
4 TABLET, FILM COATED ORAL 3 TIMES DAILY PRN
Qty: 15 TABLET | Refills: 0 | Status: SHIPPED | OUTPATIENT
Start: 2020-04-04 | End: 2020-10-29

## 2020-04-04 RX ORDER — BACLOFEN 10 MG/1
10 TABLET ORAL 3 TIMES DAILY PRN
Qty: 30 TABLET | Refills: 0 | Status: SHIPPED | OUTPATIENT
Start: 2020-04-04 | End: 2020-10-29

## 2020-04-04 RX ADMIN — KETOROLAC TROMETHAMINE 60 MG: 30 INJECTION, SOLUTION INTRAMUSCULAR; INTRAVENOUS at 14:59

## 2020-04-04 RX ADMIN — ONDANSETRON 4 MG: 4 TABLET, ORALLY DISINTEGRATING ORAL at 15:38

## 2020-04-04 RX ADMIN — TRIAMCINOLONE ACETONIDE 20 MG: 40 INJECTION, SUSPENSION INTRA-ARTICULAR; INTRAMUSCULAR at 15:01

## 2020-04-04 ASSESSMENT — ENCOUNTER SYMPTOMS
CONSTIPATION: 0
BACK PAIN: 1
DIARRHEA: 0
NAUSEA: 1
VOMITING: 1
ABDOMINAL PAIN: 0
BOWEL INCONTINENCE: 0

## 2020-04-04 ASSESSMENT — PATIENT HEALTH QUESTIONNAIRE - PHQ9
SUM OF ALL RESPONSES TO PHQ QUESTIONS 1-9: 0
2. FEELING DOWN, DEPRESSED OR HOPELESS: 0
1. LITTLE INTEREST OR PLEASURE IN DOING THINGS: 0
SUM OF ALL RESPONSES TO PHQ9 QUESTIONS 1 & 2: 0
SUM OF ALL RESPONSES TO PHQ QUESTIONS 1-9: 0

## 2020-04-04 NOTE — PROGRESS NOTES
Pt with right sided back pain radiating up to right arm- having episodes of vomiting that started this am. Is using Motrin- we discussed with her mobic she should not take both- no fevers no chills/CP/SOB- pt is in agreement to go to [facility] for prompt eval

## 2020-04-04 NOTE — PROGRESS NOTES
BYDUREON 2 MG PEN INJECT CONTENTS OF  1 PEN (2 MG) UNDER THE SKIN ONCE A WEEK 12 each 3    LEVEMIR FLEXTOUCH 100 UNIT/ML injection pen INJECT 11 UNITS UNDER THE SKIN AT BEDTIME 15 mL 2    simvastatin (ZOCOR) 40 MG tablet TAKE 1 TABLET NIGHTLY 90 tablet 3    metFORMIN (GLUCOPHAGE-XR) 750 MG extended release tablet TAKE 1 TABLET TWICE A  tablet 3    meloxicam (MOBIC) 15 MG tablet TAKE 1 TABLET DAILY 90 tablet 3    Insulin Pen Needle (B-D ULTRAFINE III SHORT PEN) 31G X 8 MM MISC INJECT UNDER THE SKIN DAILY 90 each 3    omeprazole (PRILOSEC) 40 MG delayed release capsule TAKE 1 CAPSULE DAILY 90 capsule 3    lisinopril-hydrochlorothiazide (PRINZIDE;ZESTORETIC) 20-12.5 MG per tablet Take 1 tablet by mouth daily 90 tablet 3    aspirin 81 MG tablet Take 81 mg by mouth daily      Multiple Vitamins-Minerals (MULTIVITAMIN & MINERAL PO) Take by mouth      magnesium oxide (MAG-OX) 400 MG tablet Take 400 mg by mouth daily      loratadine (CLARITIN) 10 MG tablet Take 10 mg by mouth daily       Current Facility-Administered Medications   Medication Dose Route Frequency Provider Last Rate Last Dose    ondansetron (ZOFRAN-ODT) disintegrating tablet 4 mg  4 mg Oral Once Wild Brown MD            No Known Allergies    Subjective:     Review of Systems   Constitutional: Positive for appetite change. Negative for activity change, fatigue and fever. Gastrointestinal: Positive for nausea and vomiting. Negative for abdominal pain, bowel incontinence, constipation and diarrhea. Genitourinary: Negative for bladder incontinence and dysuria. Musculoskeletal: Positive for back pain. Negative for gait problem. Skin: Negative for rash. Neurological: Negative for tingling, weakness, numbness, headaches and paresthesias. Objective:      Physical Exam  Vitals signs and nursing note reviewed. Constitutional:       General: She is not in acute distress. Appearance: She is well-developed.    Eyes: as needed for Nausea or Vomiting     Dispense:  15 tablet     Refill:  0    baclofen (LIORESAL) 10 MG tablet     Sig: Take 1 tablet by mouth 3 times daily as needed (muscle spasm)     Dispense:  30 tablet     Refill:  0    ketorolac (TORADOL) injection 60 mg    ondansetron (ZOFRAN-ODT) disintegrating tablet 4 mg       Patientgiven educational materials - see patient instructions. Discussed use, benefit,and side effects of prescribed medications. All patient questions answered. Ptvoiced understanding. Reviewed health maintenance. Instructed to continue currentmedications, diet and exercise. Patient agreed with treatment plan. Follow up asdirected.      Electronically signed by Santa Miner MD on 4/4/2020 at 3:22 PM

## 2020-04-08 ENCOUNTER — OFFICE VISIT (OUTPATIENT)
Dept: FAMILY MEDICINE CLINIC | Age: 65
End: 2020-04-08
Payer: COMMERCIAL

## 2020-04-08 VITALS
TEMPERATURE: 97.1 F | BODY MASS INDEX: 27.96 KG/M2 | WEIGHT: 140.8 LBS | SYSTOLIC BLOOD PRESSURE: 140 MMHG | RESPIRATION RATE: 20 BRPM | DIASTOLIC BLOOD PRESSURE: 80 MMHG | OXYGEN SATURATION: 98 % | HEART RATE: 88 BPM

## 2020-04-08 PROCEDURE — 3017F COLORECTAL CA SCREEN DOC REV: CPT | Performed by: NURSE PRACTITIONER

## 2020-04-08 PROCEDURE — G8419 CALC BMI OUT NRM PARAM NOF/U: HCPCS | Performed by: NURSE PRACTITIONER

## 2020-04-08 PROCEDURE — 99203 OFFICE O/P NEW LOW 30 MIN: CPT | Performed by: NURSE PRACTITIONER

## 2020-04-08 PROCEDURE — G8427 DOCREV CUR MEDS BY ELIG CLIN: HCPCS | Performed by: NURSE PRACTITIONER

## 2020-04-08 PROCEDURE — 1036F TOBACCO NON-USER: CPT | Performed by: NURSE PRACTITIONER

## 2020-04-08 RX ORDER — PREDNISONE 20 MG/1
20 TABLET ORAL 2 TIMES DAILY
Qty: 10 TABLET | Refills: 0 | Status: SHIPPED | OUTPATIENT
Start: 2020-04-08 | End: 2020-04-13

## 2020-04-08 ASSESSMENT — ENCOUNTER SYMPTOMS
CHOKING: 0
SHORTNESS OF BREATH: 0
COUGH: 0
BACK PAIN: 1

## 2020-04-08 NOTE — LETTER
[address] [place] [address] [address] [address]  Phone: [phone]  Fax: [phone]    AMAN Brooks CNP        April 8, 2020     Patient: Julio Cesar Martin   YOB: 1955   Date of Visit: 4/8/2020       To Whom It May Concern: It is my medical opinion that Julio Cesar Martin should be excused from work through 4/14/2020. If you have any questions or concerns, please don't hesitate to call.     Sincerely,        AMAN Brooks CNP

## 2020-05-01 RX ORDER — LISINOPRIL AND HYDROCHLOROTHIAZIDE 20; 12.5 MG/1; MG/1
TABLET ORAL
Qty: 90 TABLET | Refills: 0 | Status: SHIPPED | OUTPATIENT
Start: 2020-05-01 | End: 2020-07-30 | Stop reason: SDUPTHER

## 2020-06-01 RX ORDER — MELOXICAM 15 MG/1
TABLET ORAL
Qty: 90 TABLET | Refills: 0 | Status: SHIPPED | OUTPATIENT
Start: 2020-06-01 | End: 2020-09-01

## 2020-06-18 RX ORDER — METFORMIN HYDROCHLORIDE 750 MG/1
750 TABLET, EXTENDED RELEASE ORAL 2 TIMES DAILY
Qty: 180 TABLET | Refills: 0 | Status: SHIPPED | OUTPATIENT
Start: 2020-06-18 | End: 2020-09-17

## 2020-07-14 RX ORDER — SIMVASTATIN 40 MG
40 TABLET ORAL NIGHTLY
Qty: 90 TABLET | Refills: 0 | Status: SHIPPED | OUTPATIENT
Start: 2020-07-14 | End: 2020-10-08

## 2020-07-14 NOTE — TELEPHONE ENCOUNTER
Jacoby Carr is calling to request a refill on the following medication(s):  Requested Prescriptions     Pending Prescriptions Disp Refills    simvastatin (ZOCOR) 40 MG tablet 90 tablet 0     Sig: Take 1 tablet by mouth nightly       Last Visit Date (If Applicable):  8/6/8794    Next Visit Date:    Visit date not found

## 2020-07-30 RX ORDER — LISINOPRIL AND HYDROCHLOROTHIAZIDE 20; 12.5 MG/1; MG/1
TABLET ORAL
Qty: 90 TABLET | Refills: 0 | Status: SHIPPED | OUTPATIENT
Start: 2020-07-30 | End: 2020-10-28

## 2020-07-30 NOTE — TELEPHONE ENCOUNTER
Sherlyn Calle is requesting a refill on the following medication(s):  Requested Prescriptions     Pending Prescriptions Disp Refills    lisinopril-hydroCHLOROthiazide (PRINZIDE;ZESTORETIC) 20-12.5 MG per tablet 90 tablet 0     Sig: TAKE 1 TABLET DAILY       Last Visit Date (If Applicable):  4/85/2855    Next Visit Date:    Visit date not found

## 2020-08-07 NOTE — TELEPHONE ENCOUNTER
Misael Huynh is requesting a refill on the following medication(s):  Requested Prescriptions     Pending Prescriptions Disp Refills    omeprazole (PRILOSEC) 40 MG delayed release capsule 90 capsule 3     Sig: Take 1 capsule by mouth daily       Last Visit Date (If Applicable):  1/36/1019    Next Visit Date:    Visit date not found

## 2020-08-08 RX ORDER — OMEPRAZOLE 40 MG/1
40 CAPSULE, DELAYED RELEASE ORAL DAILY
Qty: 90 CAPSULE | Refills: 3 | Status: SHIPPED | OUTPATIENT
Start: 2020-08-08 | End: 2021-08-04

## 2020-09-01 RX ORDER — MELOXICAM 15 MG/1
TABLET ORAL
Qty: 90 TABLET | Refills: 3 | Status: SHIPPED | OUTPATIENT
Start: 2020-09-01 | End: 2021-08-04

## 2020-09-01 NOTE — TELEPHONE ENCOUNTER
[facility] is requesting a refill on the following medication(s):  Requested Prescriptions     Pending Prescriptions Disp Refills    meloxicam (MOBIC) 15 MG tablet [Pharmacy Med Name: MELOXICAM TABS 15MG] 90 tablet 3     Sig: TAKE 1 TABLET DAILY       Last Visit Date (If Applicable):  Visit date not found    Next Visit Date:    Visit date not found

## 2020-09-16 NOTE — TELEPHONE ENCOUNTER
Fahad Cortes is requesting a refill on the following medication(s):  Requested Prescriptions     Pending Prescriptions Disp Refills    metFORMIN (GLUCOPHAGE-XR) 750 MG extended release tablet [Pharmacy Med Name: METFORMIN HCL ER TABS 750MG] 180 tablet 3     Sig: TAKE 1 TABLET TWICE A DAY       Last Visit Date (If Applicable):  5/72/0353    Next Visit Date:    Visit date not found

## 2020-09-17 RX ORDER — METFORMIN HYDROCHLORIDE 750 MG/1
TABLET, EXTENDED RELEASE ORAL
Qty: 180 TABLET | Refills: 3 | Status: SHIPPED | OUTPATIENT
Start: 2020-09-17 | End: 2021-09-13

## 2020-10-07 NOTE — TELEPHONE ENCOUNTER
Marian Roman is requesting a refill on the following medication(s):  Requested Prescriptions     Pending Prescriptions Disp Refills    simvastatin (ZOCOR) 40 MG tablet [Pharmacy Med Name: SIMVASTATIN TABS 40MG] 90 tablet 3     Sig: TAKE 1 TABLET NIGHTLY       Last Visit Date (If Applicable):  6/8/8429    Next Visit Date:    Visit date not found

## 2020-10-08 RX ORDER — SIMVASTATIN 40 MG
TABLET ORAL
Qty: 90 TABLET | Refills: 3 | Status: SHIPPED | OUTPATIENT
Start: 2020-10-08 | End: 2021-01-20 | Stop reason: SINTOL

## 2020-10-09 NOTE — TELEPHONE ENCOUNTER
Alex Sims is calling to request a refill on the following medication(s):  Requested Prescriptions     Pending Prescriptions Disp Refills    insulin detemir (LEVEMIR FLEXTOUCH) 100 UNIT/ML injection pen 15 mL 2     Sig: INJECT 11 UNITS UNDER THE SKIN AT BEDTIME       Last Visit Date (If Applicable):  8/51/7715    Next Visit Date:    Visit date not found

## 2020-10-10 RX ORDER — INSULIN DETEMIR 100 [IU]/ML
INJECTION, SOLUTION SUBCUTANEOUS
Qty: 15 ML | Refills: 2 | Status: SHIPPED | OUTPATIENT
Start: 2020-10-10 | End: 2021-09-29

## 2020-10-23 LAB
CHOLESTEROL, TOTAL: 223 MG/DL
CHOLESTEROL/HDL RATIO: 4.85
HDLC SERPL-MCNC: 46 MG/DL (ref 35–70)
LDL CHOLESTEROL CALCULATED: 131 MG/DL (ref 0–160)
NONHDLC SERPL-MCNC: NORMAL MG/DL
TRIGL SERPL-MCNC: 230 MG/DL
VLDLC SERPL CALC-MCNC: 46 MG/DL

## 2020-10-29 ENCOUNTER — OFFICE VISIT (OUTPATIENT)
Dept: FAMILY MEDICINE CLINIC | Age: 65
End: 2020-10-29
Payer: COMMERCIAL

## 2020-10-29 VITALS
OXYGEN SATURATION: 97 % | HEART RATE: 82 BPM | HEIGHT: 60 IN | BODY MASS INDEX: 29.25 KG/M2 | WEIGHT: 149 LBS | DIASTOLIC BLOOD PRESSURE: 78 MMHG | SYSTOLIC BLOOD PRESSURE: 132 MMHG

## 2020-10-29 PROBLEM — D50.9 IRON DEFICIENCY ANEMIA: Status: ACTIVE | Noted: 2020-10-29

## 2020-10-29 LAB — HBA1C MFR BLD: 7.3 %

## 2020-10-29 PROCEDURE — 90670 PCV13 VACCINE IM: CPT | Performed by: FAMILY MEDICINE

## 2020-10-29 PROCEDURE — 1036F TOBACCO NON-USER: CPT | Performed by: FAMILY MEDICINE

## 2020-10-29 PROCEDURE — 1123F ACP DISCUSS/DSCN MKR DOCD: CPT | Performed by: FAMILY MEDICINE

## 2020-10-29 PROCEDURE — 90471 IMMUNIZATION ADMIN: CPT | Performed by: FAMILY MEDICINE

## 2020-10-29 PROCEDURE — 3017F COLORECTAL CA SCREEN DOC REV: CPT | Performed by: FAMILY MEDICINE

## 2020-10-29 PROCEDURE — G8427 DOCREV CUR MEDS BY ELIG CLIN: HCPCS | Performed by: FAMILY MEDICINE

## 2020-10-29 PROCEDURE — 83036 HEMOGLOBIN GLYCOSYLATED A1C: CPT | Performed by: FAMILY MEDICINE

## 2020-10-29 PROCEDURE — 2022F DILAT RTA XM EVC RTNOPTHY: CPT | Performed by: FAMILY MEDICINE

## 2020-10-29 PROCEDURE — 4040F PNEUMOC VAC/ADMIN/RCVD: CPT | Performed by: FAMILY MEDICINE

## 2020-10-29 PROCEDURE — 99214 OFFICE O/P EST MOD 30 MIN: CPT | Performed by: FAMILY MEDICINE

## 2020-10-29 PROCEDURE — 90472 IMMUNIZATION ADMIN EACH ADD: CPT | Performed by: FAMILY MEDICINE

## 2020-10-29 PROCEDURE — G8399 PT W/DXA RESULTS DOCUMENT: HCPCS | Performed by: FAMILY MEDICINE

## 2020-10-29 PROCEDURE — G8417 CALC BMI ABV UP PARAM F/U: HCPCS | Performed by: FAMILY MEDICINE

## 2020-10-29 PROCEDURE — G8482 FLU IMMUNIZE ORDER/ADMIN: HCPCS | Performed by: FAMILY MEDICINE

## 2020-10-29 PROCEDURE — 1090F PRES/ABSN URINE INCON ASSESS: CPT | Performed by: FAMILY MEDICINE

## 2020-10-29 PROCEDURE — 90715 TDAP VACCINE 7 YRS/> IM: CPT | Performed by: FAMILY MEDICINE

## 2020-10-29 PROCEDURE — 3051F HG A1C>EQUAL 7.0%<8.0%: CPT | Performed by: FAMILY MEDICINE

## 2020-10-29 RX ORDER — GLUCOSAMINE HCL/CHONDROITIN SU 500-400 MG
CAPSULE ORAL
Qty: 100 STRIP | Refills: 0 | Status: SHIPPED | OUTPATIENT
Start: 2020-10-29 | End: 2021-11-30 | Stop reason: SDUPTHER

## 2020-10-29 RX ORDER — BLOOD-GLUCOSE METER
1 KIT MISCELLANEOUS DAILY
Qty: 1 KIT | Refills: 0 | Status: SHIPPED | OUTPATIENT
Start: 2020-10-29

## 2020-10-29 RX ORDER — LANCETS 30 GAUGE
1 EACH MISCELLANEOUS 2 TIMES DAILY
Qty: 100 EACH | Refills: 5 | Status: SHIPPED | OUTPATIENT
Start: 2020-10-29

## 2020-10-29 RX ORDER — FERROUS SULFATE 325(65) MG
325 TABLET ORAL
COMMUNITY
End: 2021-01-20

## 2020-10-29 ASSESSMENT — PATIENT HEALTH QUESTIONNAIRE - PHQ9
SUM OF ALL RESPONSES TO PHQ QUESTIONS 1-9: 0
SUM OF ALL RESPONSES TO PHQ9 QUESTIONS 1 & 2: 0
2. FEELING DOWN, DEPRESSED OR HOPELESS: 0
SUM OF ALL RESPONSES TO PHQ QUESTIONS 1-9: 0
SUM OF ALL RESPONSES TO PHQ QUESTIONS 1-9: 0
1. LITTLE INTEREST OR PLEASURE IN DOING THINGS: 0

## 2020-10-29 NOTE — PATIENT INSTRUCTIONS
Check the labs due to the leg cramps -- if normal then try stopping the cholesterol medication for 2-3 weeks and let me know how this is going. Would consider if this helps trying a different med such as atorvastatin or crestor.

## 2020-10-29 NOTE — PROGRESS NOTES
120/80)    Wt Readings from Last 3 Encounters:   10/29/20 149 lb (67.6 kg)   20 140 lb 12.8 oz (63.9 kg)   20 145 lb (65.8 kg)        Past Medical History:   Diagnosis Date    Hyperlipidemia     Hypertension     Nephrolithiasis     Type 2 diabetes mellitus without complication [facility])       Past Surgical History:   Procedure Laterality Date    CHOLECYSTECTOMY      COLONOSCOPY  2018    Dr Eder Taylor- negative/normal        History reviewed. No pertinent family history. Social History     Tobacco Use    Smoking status: Former Smoker     Packs/day: 0.50     Years: 4.00     Pack years: 2.00     Last attempt to quit: 1997     Years since quittin.4    Smokeless tobacco: Never Used   Substance Use Topics    Alcohol use: Yes      Current Outpatient Medications   Medication Sig Dispense Refill    ferrous sulfate (IRON 325) 325 (65 Fe) MG tablet Take 325 mg by mouth daily (with breakfast)      glucose monitoring kit (FREESTYLE) monitoring kit 1 kit by Does not apply route daily 1 kit 0    blood glucose monitor strips Test 1-2  times a day & as needed for symptoms of irregular blood glucose. Dispense sufficient amount for indicated testing frequency plus additional to accommodate PRN testing needs.  100 strip 0    Lancets MISC 1 each by Does not apply route 2 times daily 100 each 5    Tetanus-Diphth-Acell Pertussis (BOOSTRIX) 5-2.5-18.5 LF-MCG/0.5 injection Inject 0.5 mLs into the muscle once for 1 dose 1 vial 0    insulin detemir (LEVEMIR FLEXTOUCH) 100 UNIT/ML injection pen INJECT 11 UNITS UNDER THE SKIN AT BEDTIME 15 mL 2    simvastatin (ZOCOR) 40 MG tablet TAKE 1 TABLET NIGHTLY 90 tablet 3    metFORMIN (GLUCOPHAGE-XR) 750 MG extended release tablet TAKE 1 TABLET TWICE A  tablet 3    meloxicam (MOBIC) 15 MG tablet TAKE 1 TABLET DAILY 90 tablet 3    omeprazole (PRILOSEC) 40 MG delayed release capsule Take 1 capsule by mouth daily 90 capsule 3    lisinopril-hydroCHLOROthiazide HENT:      Head: Normocephalic and atraumatic. Eyes:      Extraocular Movements: Extraocular movements intact. Conjunctiva/sclera: Conjunctivae normal.      Pupils: Pupils are equal, round, and reactive to light. Neck:      Musculoskeletal: Normal range of motion and neck supple. Thyroid: No thyromegaly. Vascular: No JVD. Cardiovascular:      Rate and Rhythm: Normal rate and regular rhythm. Pulses: Normal pulses. Heart sounds: Normal heart sounds. No murmur. No friction rub. No gallop. Pulmonary:      Effort: Pulmonary effort is normal. No respiratory distress. Breath sounds: Normal breath sounds. Musculoskeletal:         General: No swelling, tenderness or deformity. Right lower leg: No edema. Left lower leg: No edema. Lymphadenopathy:      Cervical: No cervical adenopathy. Skin:     General: Skin is warm. Neurological:      General: No focal deficit present. Mental Status: She is alert and oriented to person, place, and time. Cranial Nerves: No cranial nerve deficit. Sensory: No sensory deficit. Psychiatric:         Mood and Affect: Mood normal.         Behavior: Behavior normal.         Thought Content: Thought content normal.         Judgment: Judgment normal.           Diabetic Foot Exam    Deformities: Yes  arthritic change  Pulses:  normal,   Edema: normal  Skin lesions: normal  Callous:  Yes  Nails: normal    Sensory exam  Monofilament Sensation Left Foot: 10/10   Monofilament Sensation Right Foot: 10/10       Assessment/Plan:      Diagnosis Orders   1. Type 2 diabetes mellitus without complication, with long-term current use of insulin ([facility])  POCT glycosylated hemoglobin (Hb A1C)    Basic Metabolic Panel    glucose monitoring kit (FREESTYLE) monitoring kit    blood glucose monitor strips    Lancets MISC    HM DIABETES FOOT EXAM   2. Essential hypertension  Basic Metabolic Panel   3. Mixed hyperlipidemia     4.  Iron deficiency anemia secondary to inadequate dietary iron intake  CBC Auto Differential    Iron and TIBC   5. Adverse effect due to correct medicinal substance, properly given, subsequent encounter  Vitamin B12    Magnesium   6. Need for vaccination for pneumococcus  pneumococcal 13-valent conjugate (PREVNAR) injection 0.5 mL   7. Need for diphtheria-tetanus-pertussis (Tdap) vaccine  Tetanus-Diphth-Acell Pertussis (BOOSTRIX) 5-2.5-18.5 LF-MCG/0.5 injection     Encourage diet and exercise for her diabetes. If not improving at the next visit then would consider increasing her insulin. Patient Instructions   Check the labs due to the leg cramps -- if normal then try stopping the cholesterol medication for 2-3 weeks and let me know how this is going. Would consider if this helps trying a different med such as atorvastatin or crestor. Hypertension is well controlled at this time no changes in current medications. Has a known iron deficiency and will check all of these to see if these are contributing to her leg cramps. If not improved with the daily iron intake and may need to pursue further evaluation for the underlying etiology of her iron deficiency. Lab Results   Component Value Date    WBC 10.3 03/10/2020    HGB 11.3 (L) 03/10/2020    HCT 34.6 (L) 03/10/2020     03/10/2020    CHOL 223 10/23/2020    TRIG 230 10/23/2020    HDL 46 10/23/2020    ALT 18 03/10/2020    AST 22 03/10/2020     03/10/2020    K 4.1 03/10/2020    CL 99 03/10/2020    CREATININE 1.07 (H) 03/10/2020    BUN 24 (H) 03/10/2020    CO2 25 03/10/2020    GLUF 133 (H) 03/10/2020    LABA1C 7.3 10/29/2020    LABA1C 7.1 03/10/2020    LABA1C 5.9 05/07/2019    LABMICR 15 03/10/2020       Return in about 6 months (around 4/29/2021) for DM follow up, Pap/ well woman. Patient given educational materials - see patientinstructions. Discussed use, benefit, and side effects of prescribed medications. All patient questions answered.   Pt voiced understanding. Reviewed health maintenance. Instructed to continue current medications, diet andexercise. Patient agreed with treatment plan. Follow up as directed.      (Please note that portions of this note were completed with a voice-recognition program. Efforts were made to edit the dictation but occasionally words are mis-transcribed.)    Electronically signed by Henri Aguilar MD on 10/29/2020

## 2020-10-30 LAB
ANION GAP SERPL CALCULATED.3IONS-SCNC: 13.9 MMOL/L
BASOPHILS %: 1.34 (ref 0–3)
BASOPHILS ABSOLUTE: 0.12 (ref 0–0.3)
BUN BLDV-MCNC: 23 MG/DL (ref 7–17)
CALCIUM SERPL-MCNC: 10.2 MG/DL (ref 8.4–10.2)
CHLORIDE BLD-SCNC: 99 MMOL/L (ref 98–120)
CO2: 28 MMOL/L (ref 22–31)
CREAT SERPL-MCNC: 1 MG/DL (ref 0.5–1)
EOSINOPHILS %: 3.71 (ref 0–10)
EOSINOPHILS ABSOLUTE: 0.34 (ref 0–1.1)
GFR CALCULATED: 59.1
GLUCOSE: 121 MG/DL (ref 65–105)
HCT VFR BLD CALC: 38.3 % (ref 37–47)
HEMOGLOBIN: 12.7 (ref 12–16)
IRON SATURATION: 29 % (ref 15–38)
IRON: 110 MG/DL (ref 37–170)
LYMPHOCYTE %: 23.94 (ref 20–51.1)
LYMPHOCYTES ABSOLUTE: 2.22 (ref 1–5.5)
MAGNESIUM: 1.4 MG/DL (ref 1.6–2.3)
MCH RBC QN AUTO: 29.9 PG (ref 28.5–32.5)
MCHC RBC AUTO-ENTMCNC: 33 G/DL (ref 32–37)
MCV RBC AUTO: 90.3 FL (ref 80–94)
MONOCYTES %: 6.39 (ref 1.7–9.3)
MONOCYTES ABSOLUTE: 0.59 (ref 0.1–1)
NEUTROPHILS %: 64.62 (ref 42.2–75.2)
NEUTROPHILS ABSOLUTE: 5.99 (ref 2–8.1)
PDW BLD-RTO: 11.9 % (ref 8.5–15.5)
PLATELET # BLD: 291.4 THOU/MM3 (ref 130–400)
POTASSIUM SERPL-SCNC: 4.9 MMOL/L (ref 3.6–5)
RBC: 4.24 M/UL (ref 4.2–5.4)
SODIUM BLD-SCNC: 136 MMOL/L (ref 135–145)
TOTAL IRON BINDING CAPACITY: 378 MG/DL (ref 261–497)
VITAMIN B-12: 483 PG/ML (ref 239–931)
WBC: 9.3 THOU/ML3 (ref 4.8–10.8)

## 2020-10-30 RX ORDER — LISINOPRIL AND HYDROCHLOROTHIAZIDE 20; 12.5 MG/1; MG/1
TABLET ORAL
Qty: 90 TABLET | Refills: 3 | Status: SHIPPED | OUTPATIENT
Start: 2020-10-30 | End: 2021-10-25

## 2021-01-20 ENCOUNTER — OFFICE VISIT (OUTPATIENT)
Dept: FAMILY MEDICINE CLINIC | Age: 66
End: 2021-01-20
Payer: COMMERCIAL

## 2021-01-20 VITALS
DIASTOLIC BLOOD PRESSURE: 86 MMHG | SYSTOLIC BLOOD PRESSURE: 128 MMHG | BODY MASS INDEX: 28.8 KG/M2 | HEART RATE: 86 BPM | OXYGEN SATURATION: 97 % | WEIGHT: 145 LBS

## 2021-01-20 DIAGNOSIS — E78.2 MIXED HYPERLIPIDEMIA: ICD-10-CM

## 2021-01-20 DIAGNOSIS — Z79.4 TYPE 2 DIABETES MELLITUS WITHOUT COMPLICATION, WITH LONG-TERM CURRENT USE OF INSULIN (HCC): ICD-10-CM

## 2021-01-20 DIAGNOSIS — I10 ESSENTIAL HYPERTENSION: ICD-10-CM

## 2021-01-20 DIAGNOSIS — I65.21 CALCIFICATION OF RIGHT CAROTID ARTERY: Primary | ICD-10-CM

## 2021-01-20 DIAGNOSIS — E83.42 HYPOMAGNESEMIA: ICD-10-CM

## 2021-01-20 DIAGNOSIS — R25.2 LEG CRAMPS: ICD-10-CM

## 2021-01-20 DIAGNOSIS — T50.905A ADVERSE EFFECT OF DRUG, INITIAL ENCOUNTER: ICD-10-CM

## 2021-01-20 DIAGNOSIS — E11.9 TYPE 2 DIABETES MELLITUS WITHOUT COMPLICATION, WITH LONG-TERM CURRENT USE OF INSULIN (HCC): ICD-10-CM

## 2021-01-20 PROCEDURE — G8417 CALC BMI ABV UP PARAM F/U: HCPCS | Performed by: FAMILY MEDICINE

## 2021-01-20 PROCEDURE — G8427 DOCREV CUR MEDS BY ELIG CLIN: HCPCS | Performed by: FAMILY MEDICINE

## 2021-01-20 PROCEDURE — 4040F PNEUMOC VAC/ADMIN/RCVD: CPT | Performed by: FAMILY MEDICINE

## 2021-01-20 PROCEDURE — G8482 FLU IMMUNIZE ORDER/ADMIN: HCPCS | Performed by: FAMILY MEDICINE

## 2021-01-20 PROCEDURE — 3017F COLORECTAL CA SCREEN DOC REV: CPT | Performed by: FAMILY MEDICINE

## 2021-01-20 PROCEDURE — G8399 PT W/DXA RESULTS DOCUMENT: HCPCS | Performed by: FAMILY MEDICINE

## 2021-01-20 PROCEDURE — 99214 OFFICE O/P EST MOD 30 MIN: CPT | Performed by: FAMILY MEDICINE

## 2021-01-20 PROCEDURE — 2022F DILAT RTA XM EVC RTNOPTHY: CPT | Performed by: FAMILY MEDICINE

## 2021-01-20 PROCEDURE — 1123F ACP DISCUSS/DSCN MKR DOCD: CPT | Performed by: FAMILY MEDICINE

## 2021-01-20 PROCEDURE — 3046F HEMOGLOBIN A1C LEVEL >9.0%: CPT | Performed by: FAMILY MEDICINE

## 2021-01-20 PROCEDURE — 1090F PRES/ABSN URINE INCON ASSESS: CPT | Performed by: FAMILY MEDICINE

## 2021-01-20 PROCEDURE — 1036F TOBACCO NON-USER: CPT | Performed by: FAMILY MEDICINE

## 2021-01-20 RX ORDER — UREA 10 %
500 LOTION (ML) TOPICAL DAILY
Qty: 90 TABLET | Refills: 3 | Status: SHIPPED | OUTPATIENT
Start: 2021-01-20 | End: 2021-04-28

## 2021-01-20 RX ORDER — ROSUVASTATIN CALCIUM 10 MG/1
10 TABLET, COATED ORAL NIGHTLY
Qty: 90 TABLET | Refills: 3 | Status: SHIPPED | OUTPATIENT
Start: 2021-01-20 | End: 2022-01-31 | Stop reason: SDUPTHER

## 2021-01-20 ASSESSMENT — PATIENT HEALTH QUESTIONNAIRE - PHQ9
2. FEELING DOWN, DEPRESSED OR HOPELESS: 0
SUM OF ALL RESPONSES TO PHQ QUESTIONS 1-9: 0
1. LITTLE INTEREST OR PLEASURE IN DOING THINGS: 0

## 2021-01-20 NOTE — PROGRESS NOTES
[facility]  [address]  Dept: [phone]  Dept RFB:[phone]    Inocencia Oropeza is a 72 y.o. female who presents today for her medical conditions/complaints as notedbelow. Inocencia Oropeza is c/o of Nodule (patient had xray done at the dentist and she saw a calcified spot in the right side of her neck.) and Leg Pain (simvastatin causes leg cramps in her thighs when she takes it without the magnesium. She had to stop magnesium because it was causing diarrhea)      HPI:     HPI    Saw her dentist and she took an Xr and saw a \"calcification\" in the upper neck near the lower jaw on the right. No symptoms. -- no neurologic symptoms. Has never had a carotid scan that she is aware of. Her risk factors of hypertension hyperlipidemia and her diabetes have been well controlled on her current medical regimen until this time. She does have a strong family history of vascular disease. She has no chest pain shortness of breath or other signs or symptoms of current vascular disease    Has been on the simvastatin for a long time. Did stop the simvastatin because of ongoing leg pain and since she stopped it the leg cramps have significantly improved. She was taking magnesium for the hypomagnesium noted on her lab tests and also because it was helping somewhat with her leg cramps. Unfortunately the diarrhea was awful so she had to stop the medication. Was taking Mag-Ox 400 mg daily. Lab test showed magnesium level of 1.4 in October 2020.         BP Readings from Last 3 Encounters:   01/20/21 128/86   10/29/20 132/78   04/08/20 (!) 140/80          (goal 120/80)    Wt Readings from Last 3 Encounters:   01/20/21 145 lb (65.8 kg)   10/29/20 149 lb (67.6 kg)   04/08/20 140 lb 12.8 oz (63.9 kg)        Past Medical History:   Diagnosis Date    Hyperlipidemia     Hypertension     Nephrolithiasis     Type 2 diabetes mellitus without complication ([facility] [address] Past Surgical History:   Procedure Laterality Date    CHOLECYSTECTOMY      COLONOSCOPY  2018    Dr Koenig Keep- negative/normal        History reviewed. No pertinent family history. Social History     Tobacco Use    Smoking status: Former Smoker     Packs/day: 0.50     Years: 4.00     Pack years: 2.00     Quit date: 1997     Years since quittin.6    Smokeless tobacco: Never Used   Substance Use Topics    Alcohol use: Yes      Current Outpatient Medications   Medication Sig Dispense Refill    Magnesium Gluconate 500 (27 Mg) MG TABS tablet Take 1 tablet by mouth daily 90 tablet 3    rosuvastatin (CRESTOR) 10 MG tablet Take 1 tablet by mouth nightly 90 tablet 3    lisinopril-hydroCHLOROthiazide (PRINZIDE;ZESTORETIC) 20-12.5 MG per tablet TAKE 1 TABLET DAILY 90 tablet 3    insulin detemir (LEVEMIR FLEXTOUCH) 100 UNIT/ML injection pen INJECT 11 UNITS UNDER THE SKIN AT BEDTIME 15 mL 2    metFORMIN (GLUCOPHAGE-XR) 750 MG extended release tablet TAKE 1 TABLET TWICE A  tablet 3    meloxicam (MOBIC) 15 MG tablet TAKE 1 TABLET DAILY 90 tablet 3    omeprazole (PRILOSEC) 40 MG delayed release capsule Take 1 capsule by mouth daily 90 capsule 3    BYDUREON 2 MG PEN INJECT CONTENTS OF  1 PEN (2 MG) UNDER THE SKIN ONCE A WEEK 12 each 3    aspirin 81 MG tablet Take 81 mg by mouth daily      Multiple Vitamins-Minerals (MULTIVITAMIN & MINERAL PO) Take by mouth      glucose monitoring kit (FREESTYLE) monitoring kit 1 kit by Does not apply route daily 1 kit 0    blood glucose monitor strips Test 1-2  times a day & as needed for symptoms of irregular blood glucose. Dispense sufficient amount for indicated testing frequency plus additional to accommodate PRN testing needs.  100 strip 0    Lancets MISC 1 each by Does not apply route 2 times daily 100 each 5    Insulin Pen Needle (B-D ULTRAFINE III SHORT PEN) 31G X 8 MM MISC INJECT UNDER THE SKIN DAILY 90 each 3     No current facility-administered breath sounds. Musculoskeletal:         General: No tenderness. Right lower leg: No edema. Left lower leg: No edema. Lymphadenopathy:      Cervical: No cervical adenopathy. Neurological:      Mental Status: She is alert. Psychiatric:         Mood and Affect: Mood normal.         Behavior: Behavior normal.         Thought Content: Thought content normal.         Judgment: Judgment normal.               Assessment/Plan:      Diagnosis Orders   1. Calcification of right carotid artery  US CAROTID ARTERY BILATERAL   2. Leg cramps     3. Hypomagnesemia  Magnesium Gluconate 500 (27 Mg) MG TABS tablet   4. Adverse effect of drug, initial encounter     5. Type 2 diabetes mellitus without complication, with long-term current use of insulin ([address]     6. Essential hypertension     7. Mixed hyperlipidemia       With her significant risk factors for vascular disease and the significant calcifications noted on the x-ray see above will go ahead and check a carotid ultrasound as she has never had this done. In the interim we will continue maximal risk factor reduction and continue her current treatment for her hypertension and diabetes which are asymptomatic at this time. We will stop the simvastatin to try Crestor for her hyperlipidemia as well. We will stop the Mag-Ox and try magnesium gluconate to see if this is better tolerated without as significant amount of diarrhea. Recheck her labs for the cholesterol in 6 to 12 months and the magnesium in about 6 months.     Lab Results   Component Value Date    WBC 9.3 10/30/2020    HGB 12.7 10/30/2020    HCT 38.3 10/30/2020    .4 10/30/2020    CHOL 223 10/23/2020    TRIG 230 10/23/2020    HDL 46 10/23/2020    ALT 18 03/10/2020    AST 22 03/10/2020     10/30/2020    K 4.9 10/30/2020    CL 99 10/30/2020    CREATININE 1.0 10/30/2020    BUN 23 (H) 10/30/2020    CO2 28 10/30/2020    GLUF 133 (H) 03/10/2020    LABA1C 7.3 10/29/2020    LABA1C 7.1 03/10/2020 LABA1C 5.9 05/07/2019    LABMICR 15 03/10/2020       Return for As scheduled. Patient given educational materials - see patientinstructions. Discussed use, benefit, and side effects of prescribed medications. All patient questions answered. Pt voiced understanding. Reviewed health maintenance. Instructed to continue current medications, diet andexercise. Patient agreed with treatment plan. Follow up as directed.      (Please note that portions of this note were completed with a voice-recognition program. Efforts were made to edit the dictation but occasionally words are mis-transcribed.)    Electronically signed by Heidi Bansal MD on 1/20/2021

## 2021-02-01 DIAGNOSIS — Z79.4 TYPE 2 DIABETES MELLITUS WITHOUT COMPLICATION, WITH LONG-TERM CURRENT USE OF INSULIN (HCC): ICD-10-CM

## 2021-02-01 DIAGNOSIS — E11.9 TYPE 2 DIABETES MELLITUS WITHOUT COMPLICATION, WITH LONG-TERM CURRENT USE OF INSULIN (HCC): ICD-10-CM

## 2021-02-01 RX ORDER — EXENATIDE 2 MG/.65ML
INJECTION, SUSPENSION, EXTENDED RELEASE SUBCUTANEOUS
Qty: 12 EACH | Refills: 3 | Status: SHIPPED | OUTPATIENT
Start: 2021-02-01 | End: 2021-04-28 | Stop reason: ALTCHOICE

## 2021-02-01 NOTE — TELEPHONE ENCOUNTER
Karla Burden is requesting a refill on the following medication(s):  Requested Prescriptions     Pending Prescriptions Disp Refills    Exenatide (BYDUREON) 2 MG PEN 12 each 3     Sig: INJECT CONTENTS OF  1 PEN (2 MG) UNDER THE SKIN ONCE A WEEK       Last Visit Date (If Applicable):  6/38/2300    Next Visit Date:    4/28/2021

## 2021-04-26 DIAGNOSIS — Z79.4 TYPE 2 DIABETES MELLITUS WITHOUT COMPLICATION, WITH LONG-TERM CURRENT USE OF INSULIN (HCC): ICD-10-CM

## 2021-04-26 DIAGNOSIS — E11.9 TYPE 2 DIABETES MELLITUS WITHOUT COMPLICATION, WITH LONG-TERM CURRENT USE OF INSULIN (HCC): ICD-10-CM

## 2021-04-26 RX ORDER — EXENATIDE 2 MG/.65ML
INJECTION, SUSPENSION, EXTENDED RELEASE SUBCUTANEOUS
Qty: 12 EACH | Refills: 3 | Status: SHIPPED | OUTPATIENT
Start: 2021-04-26 | End: 2021-04-28

## 2021-04-26 NOTE — TELEPHONE ENCOUNTER
Justino Urias is requesting a refill on the following medication(s):  Requested Prescriptions     Pending Prescriptions Disp Refills    Exenatide (BYDUREON) 2 MG PEN 12 each 3     Sig: INJECT CONTENTS OF  1 PEN (2 MG) UNDER THE SKIN ONCE A WEEK       Last Visit Date (If Applicable):  9/87/0616    Next Visit Date:    4/28/2021

## 2021-04-28 ENCOUNTER — HOSPITAL ENCOUNTER (OUTPATIENT)
Age: 66
Setting detail: SPECIMEN
Discharge: HOME | End: 2021-04-28
Payer: COMMERCIAL

## 2021-04-28 ENCOUNTER — OFFICE VISIT (OUTPATIENT)
Dept: FAMILY MEDICINE CLINIC | Age: 66
End: 2021-04-28
Payer: COMMERCIAL

## 2021-04-28 VITALS
WEIGHT: 150 LBS | HEART RATE: 86 BPM | DIASTOLIC BLOOD PRESSURE: 82 MMHG | TEMPERATURE: 98.7 F | OXYGEN SATURATION: 98 % | SYSTOLIC BLOOD PRESSURE: 130 MMHG | BODY MASS INDEX: 29.79 KG/M2

## 2021-04-28 DIAGNOSIS — E78.2 MIXED HYPERLIPIDEMIA: ICD-10-CM

## 2021-04-28 DIAGNOSIS — Z01.419 PAP SMEAR, AS PART OF ROUTINE GYNECOLOGICAL EXAMINATION: ICD-10-CM

## 2021-04-28 DIAGNOSIS — I10 ESSENTIAL HYPERTENSION: ICD-10-CM

## 2021-04-28 DIAGNOSIS — Z12.31 VISIT FOR SCREENING MAMMOGRAM: ICD-10-CM

## 2021-04-28 DIAGNOSIS — Z79.4 TYPE 2 DIABETES MELLITUS WITHOUT COMPLICATION, WITH LONG-TERM CURRENT USE OF INSULIN (HCC): Primary | ICD-10-CM

## 2021-04-28 DIAGNOSIS — E11.9 TYPE 2 DIABETES MELLITUS WITHOUT COMPLICATION, WITH LONG-TERM CURRENT USE OF INSULIN (HCC): Primary | ICD-10-CM

## 2021-04-28 DIAGNOSIS — Z51.81 MEDICATION MONITORING ENCOUNTER: ICD-10-CM

## 2021-04-28 LAB — HBA1C MFR BLD: 7 %

## 2021-04-28 PROCEDURE — G0145 SCR C/V CYTO,THINLAYER,RESCR: HCPCS

## 2021-04-28 PROCEDURE — 87624 HPV HI-RISK TYP POOLED RSLT: CPT

## 2021-04-28 PROCEDURE — 99397 PER PM REEVAL EST PAT 65+ YR: CPT | Performed by: FAMILY MEDICINE

## 2021-04-28 PROCEDURE — 83036 HEMOGLOBIN GLYCOSYLATED A1C: CPT | Performed by: FAMILY MEDICINE

## 2021-04-28 RX ORDER — MAGNESIUM GLUCONATE 27 MG(500)
TABLET ORAL
COMMUNITY
Start: 2021-03-28 | End: 2021-12-23

## 2021-04-28 RX ORDER — EXENATIDE 2 MG/.85ML
2 INJECTION, SUSPENSION, EXTENDED RELEASE SUBCUTANEOUS WEEKLY
Qty: 12 PEN | Refills: 3 | Status: SHIPPED | OUTPATIENT
Start: 2021-04-28 | End: 2022-04-21 | Stop reason: SDUPTHER

## 2021-04-28 NOTE — PROGRESS NOTES
Jazmín Martins  1955  MRN:  [id number]  Date of visit:4/28/21    Subjective:    Jazmín Martins is a 72 y.o. female who presentsto [address] [place] [address] today (5/2/21) for annual female exam diabetes and Pap smear. Her last Pap test was 2014 and was normal.    She is a G2,P2. No LMP recorded. Patient is postmenopausal.  She has had no abnormal Pap tests that she is aware of. Jazmín Martins is a 72 y.o. female who presents for follow-up of hypertension, diabetes, and hyperlipidemia. She indicates that she is feeling well and denies any symptoms referable to her elevated blood pressure or diabetes. Specifically denies chest pain, palpitations, dyspnea, peripheral edema, thirst, frequent urination, and blurred vision. Current medication regimen is as listed below. She denies any side effects of medication, and has been taking it regularly. Home glucose readings have been \"not awful\" occasional 200 later in the day. AM sugars have been . Checks blood sugars 1-2 times per day. Last eye exam LNL/8/74 Diabetic complications includenone. shehas been exercising regularly. Hasbeen following a diabeticdiet.         Chief Complaint   Patient presents with    Gynecologic Exam        She has the following problem list:  Patient Active Problem List   Diagnosis    Hypertension    Type 2 diabetes mellitus ([address]    Mixed hyperlipidemia    Iron deficiency anemia        Current medications are:  Current Outpatient Medications   Medication Sig Dispense Refill    magnesium gluconate (MAGONATE) 500 MG tablet       Exenatide ER (BYDUREON BCISE) 2 MG/0.85ML MONTSE Inject 2 mg into the skin once a week 12 pen 3    rosuvastatin (CRESTOR) 10 MG tablet Take 1 tablet by mouth nightly 90 tablet 3    lisinopril-hydroCHLOROthiazide (PRINZIDE;ZESTORETIC) 20-12.5 MG per tablet TAKE 1 TABLET DAILY 90 tablet 3    glucose monitoring kit (FREESTYLE) monitoring kit 1 kit by Does not apply route daily 1 kit 0  blood glucose monitor strips Test 1-2  times a day & as needed for symptoms of irregular blood glucose. Dispense sufficient amount for indicated testing frequency plus additional to accommodate PRN testing needs. 100 strip 0    Lancets MISC 1 each by Does not apply route 2 times daily 100 each 5    insulin detemir (LEVEMIR FLEXTOUCH) 100 UNIT/ML injection pen INJECT 11 UNITS UNDER THE SKIN AT BEDTIME 15 mL 2    metFORMIN (GLUCOPHAGE-XR) 750 MG extended release tablet TAKE 1 TABLET TWICE A  tablet 3    meloxicam (MOBIC) 15 MG tablet TAKE 1 TABLET DAILY 90 tablet 3    omeprazole (PRILOSEC) 40 MG delayed release capsule Take 1 capsule by mouth daily 90 capsule 3    Insulin Pen Needle (B-D ULTRAFINE III SHORT PEN) 31G X 8 MM MISC INJECT UNDER THE SKIN DAILY 90 each 3    aspirin 81 MG tablet Take 81 mg by mouth daily      Multiple Vitamins-Minerals (MULTIVITAMIN & MINERAL PO) Take by mouth       No current facility-administered medications for this visit. She is allergic to simvastatin. She  reports that she quit smoking about 23 years ago. She has a 2.00 pack-year smoking history. She has never used smokeless tobacco.    Review of Systems    Objective:    Vitals:    04/28/21 0935   BP: 130/82   Site: Left Upper Arm   Position: Sitting   Cuff Size: Large Adult   Pulse: 86   Temp: 98.7 °F (37.1 °C)   SpO2: 98%   Weight: 150 lb (68 kg)     Body mass index is 29.79 kg/m². Well-nourished, well-developed overweightmiddle aged female, in no acute distress. Pupils are equal, round and reactive to light. Extra-ocular muscles are intact. Tympanic membranes and oropharynxare clear bilaterally. Neck is supple. There is no lymphadenopathy or thyromegaly. Breasts are symmetric. There are no dominant masses palpated bilaterally. There are no skin changes or nipple dischargebilaterally. There are no axillary lymph nodes palpable bilaterally. Chest is clear to auscultation bilaterally.   Heart sounds are regular rate and rhythm without murmur. Abdomen is soft, non-tender,non-distended, with no masses. Genito-urinary:  External genitalia has no visible lesions,  vagina is normal, cervix has no visible lesions. Uterus and adnexa are non-tender and notenlarged. Rectal exam:unremarkable external exam, hemoccult not indicated  Lower extremities have no edema. Labs done were reviewed with the patient:  Hospital Outpatient Visit on 04/28/2021   Component Date Value Ref Range Status    Specimen Description 04/28/2021 . CERVIX   Final    HPV Sample 04/28/2021 . THIN PREP   Final    HPV, Genotype 16 04/28/2021 Not Detected  Not Detected Final    HPV, Genotype 18 04/28/2021 Not Detected  Not Detected Final    HPV, High Risk Other 04/28/2021 Not Detected  Not Detected Final    HPV, Interpretation 04/28/2021        Final    Comment: This test amplifies and detects DNA of 14 high-risk HPV types associated with cervical   cancer and its precursor lesions (HPV types 16,18, 31, 33, 35, 39, 45, 51, 52, 56, 58, 59,   66, and 68). Sensitivity may be affected by specimen collection methods, stage of infection, and the   presence of interfering substances. Results should be interpreted in conjunction with other available laboratory and clinical   data. A negative high-risk HPV result does not exclude the possibility of future cytologic HSIL or   underlying CIN2-3 or cancer. This test is intended for medical purposes only and is not valid for the evaluation of   suspected sexual abuse or for other forensic purposes. Office Visit on 04/28/2021   Component Date Value Ref Range Status    Hemoglobin A1C 04/28/2021 7.0  % Final       Assessment and Plan:  Well female exam with routine gynecological exam  Pap smearwas obtained today using the Thin Prep method. She willbe contacted with results. Mammogram was ordered.    She was advised to continue annual mammograms and physical exams, if negative no

## 2021-04-30 LAB
HPV SAMPLE: NORMAL
HPV, GENOTYPE 16: NOT DETECTED
HPV, GENOTYPE 18: NOT DETECTED
HPV, HIGH RISK OTHER: NOT DETECTED
HPV, INTERPRETATION: NORMAL
SPECIMEN DESCRIPTION: NORMAL

## 2021-05-03 LAB — CYTOLOGY REPORT: NORMAL

## 2021-06-09 ENCOUNTER — OFFICE VISIT (OUTPATIENT)
Dept: FAMILY MEDICINE CLINIC | Age: 66
End: 2021-06-09
Payer: COMMERCIAL

## 2021-06-09 ENCOUNTER — HOSPITAL ENCOUNTER (OUTPATIENT)
Age: 66
Setting detail: SPECIMEN
Discharge: HOME | End: 2021-06-09
Payer: COMMERCIAL

## 2021-06-09 VITALS
BODY MASS INDEX: 27.98 KG/M2 | DIASTOLIC BLOOD PRESSURE: 86 MMHG | HEART RATE: 80 BPM | OXYGEN SATURATION: 98 % | SYSTOLIC BLOOD PRESSURE: 160 MMHG | WEIGHT: 140.9 LBS

## 2021-06-09 DIAGNOSIS — R11.2 INTRACTABLE VOMITING WITH NAUSEA, UNSPECIFIED VOMITING TYPE: ICD-10-CM

## 2021-06-09 DIAGNOSIS — E86.0 DEHYDRATION: ICD-10-CM

## 2021-06-09 DIAGNOSIS — R94.5 ABNORMAL LIVER FUNCTION: ICD-10-CM

## 2021-06-09 DIAGNOSIS — E83.52 HYPERCALCEMIA: ICD-10-CM

## 2021-06-09 DIAGNOSIS — E86.0 DEHYDRATION: Primary | ICD-10-CM

## 2021-06-09 LAB
ALBUMIN SERPL-MCNC: 4.5 G/DL (ref 3.5–5.2)
ALBUMIN/GLOBULIN RATIO: 1.6 (ref 1–2.5)
ALP BLD-CCNC: 62 U/L (ref 35–104)
ALT SERPL-CCNC: 41 U/L (ref 5–33)
ANION GAP SERPL CALCULATED.3IONS-SCNC: 11 MMOL/L (ref 9–17)
AST SERPL-CCNC: 31 U/L
BILIRUB SERPL-MCNC: 0.25 MG/DL (ref 0.3–1.2)
BUN BLDV-MCNC: 21 MG/DL (ref 8–23)
BUN/CREAT BLD: 22 (ref 9–20)
CALCIUM SERPL-MCNC: 10.2 MG/DL (ref 8.6–10.4)
CHLORIDE BLD-SCNC: 100 MMOL/L (ref 98–107)
CO2: 27 MMOL/L (ref 20–31)
CREAT SERPL-MCNC: 0.97 MG/DL (ref 0.5–0.9)
GFR AFRICAN AMERICAN: >60 ML/MIN
GFR NON-AFRICAN AMERICAN: 58 ML/MIN
GFR SERPL CREATININE-BSD FRML MDRD: ABNORMAL ML/MIN/{1.73_M2}
GFR SERPL CREATININE-BSD FRML MDRD: ABNORMAL ML/MIN/{1.73_M2}
GLUCOSE BLD-MCNC: 149 MG/DL (ref 70–99)
POTASSIUM SERPL-SCNC: 3.7 MMOL/L (ref 3.7–5.3)
SODIUM BLD-SCNC: 138 MMOL/L (ref 135–144)
TOTAL PROTEIN: 7.3 G/DL (ref 6.4–8.3)

## 2021-06-09 PROCEDURE — G8427 DOCREV CUR MEDS BY ELIG CLIN: HCPCS | Performed by: FAMILY MEDICINE

## 2021-06-09 PROCEDURE — 36415 COLL VENOUS BLD VENIPUNCTURE: CPT

## 2021-06-09 PROCEDURE — 3017F COLORECTAL CA SCREEN DOC REV: CPT | Performed by: FAMILY MEDICINE

## 2021-06-09 PROCEDURE — 4040F PNEUMOC VAC/ADMIN/RCVD: CPT | Performed by: FAMILY MEDICINE

## 2021-06-09 PROCEDURE — 1036F TOBACCO NON-USER: CPT | Performed by: FAMILY MEDICINE

## 2021-06-09 PROCEDURE — G8417 CALC BMI ABV UP PARAM F/U: HCPCS | Performed by: FAMILY MEDICINE

## 2021-06-09 PROCEDURE — 1090F PRES/ABSN URINE INCON ASSESS: CPT | Performed by: FAMILY MEDICINE

## 2021-06-09 PROCEDURE — 1123F ACP DISCUSS/DSCN MKR DOCD: CPT | Performed by: FAMILY MEDICINE

## 2021-06-09 PROCEDURE — 1111F DSCHRG MED/CURRENT MED MERGE: CPT | Performed by: FAMILY MEDICINE

## 2021-06-09 PROCEDURE — G8399 PT W/DXA RESULTS DOCUMENT: HCPCS | Performed by: FAMILY MEDICINE

## 2021-06-09 PROCEDURE — 99214 OFFICE O/P EST MOD 30 MIN: CPT | Performed by: FAMILY MEDICINE

## 2021-06-09 PROCEDURE — 80053 COMPREHEN METABOLIC PANEL: CPT

## 2021-06-09 SDOH — ECONOMIC STABILITY: TRANSPORTATION INSECURITY
IN THE PAST 12 MONTHS, HAS LACK OF TRANSPORTATION KEPT YOU FROM MEETINGS, WORK, OR FROM GETTING THINGS NEEDED FOR DAILY LIVING?: NO

## 2021-06-09 SDOH — ECONOMIC STABILITY: TRANSPORTATION INSECURITY
IN THE PAST 12 MONTHS, HAS THE LACK OF TRANSPORTATION KEPT YOU FROM MEDICAL APPOINTMENTS OR FROM GETTING MEDICATIONS?: NO

## 2021-06-09 SDOH — ECONOMIC STABILITY: FOOD INSECURITY: WITHIN THE PAST 12 MONTHS, YOU WORRIED THAT YOUR FOOD WOULD RUN OUT BEFORE YOU GOT MONEY TO BUY MORE.: NEVER TRUE

## 2021-06-09 SDOH — ECONOMIC STABILITY: FOOD INSECURITY: WITHIN THE PAST 12 MONTHS, THE FOOD YOU BOUGHT JUST DIDN'T LAST AND YOU DIDN'T HAVE MONEY TO GET MORE.: NEVER TRUE

## 2021-06-09 ASSESSMENT — SOCIAL DETERMINANTS OF HEALTH (SDOH): HOW HARD IS IT FOR YOU TO PAY FOR THE VERY BASICS LIKE FOOD, HOUSING, MEDICAL CARE, AND HEATING?: NOT HARD AT ALL

## 2021-06-09 NOTE — PROGRESS NOTES
[address] [facility]  [address]  Dept: [phone]  Dept RGY:[phone]    Twin Steward is a 72 y.o. female who presents today for her medical conditions/complaints as notedbelow. Twin Steward is c/o of ED Follow-up [facility] ER 6/7/21 - vomiting, diarrhea and dehydration. She is eating a little bit and is drinking lots of fluids. ER had her stop some medications. )      HPI:     HPI    Started getting illl on 5.29- had a bad toothache and then started on an antibiotic and sent to an oral surgeon on 6/3 and had molar extraction. Has a history of vomiting in the past with pain. Was not able to eat and drink. Did not finish the antibiotics for the tooth because of the nausea and vomiting. Did manage almost a week for this. Was in the emergency room on 6/7 due to vomiting diarrhea and dehydration. At that time she was found to be significantly dehydrated. Because of the significant dehydration she was told to stop her Metformin her Bydureon and her lisinopril. She was mildly orthostatic in the emergency room and her BUN and creatinine had increased to 42 and 1.2 respectively with a calcium elevation to 10.8. AST and ALT were slightly elevated at 41 and 49 respectively. Protein and albumin are also mildly elevated likely consistent with hemoconcentration. Baseline BUN and creatinine were 23 and 1.0 October 2020. Since being in the ER has not had any more vomiting. The diarrhea has also improved. Is pushing herself with the meals and and the fluids. The blood sugars are  yesterday. Not feeling dizzy or lightheaded. Urination is normal again now. No burning with urination. Using the ondansetron only once.      BP Readings from Last 3 Encounters:   06/09/21 (!) 160/86   04/28/21 130/82   01/20/21 128/86          (goal 120/80)    Wt Readings from Last 3 Encounters:   06/09/21 140 lb 14.4 oz (63.9 kg)   04/28/21 150 lb (68 kg)   01/20/21 145 lb (65.8 kg)        Past Medical History:   Diagnosis Date    Hyperlipidemia     Hypertension     Nephrolithiasis     Type 2 diabetes mellitus without complication [facility])       Past Surgical History:   Procedure Laterality Date    CHOLECYSTECTOMY      COLONOSCOPY  2018    Dr Sharyle Bernard- negative/normal        History reviewed. No pertinent family history. Social History     Tobacco Use    Smoking status: Former Smoker     Packs/day: 0.50     Years: 4.00     Pack years: 2.00     Quit date: 1997     Years since quittin.0    Smokeless tobacco: Never Used   Substance Use Topics    Alcohol use: Yes      Current Outpatient Medications   Medication Sig Dispense Refill    rosuvastatin (CRESTOR) 10 MG tablet Take 1 tablet by mouth nightly 90 tablet 3    insulin detemir (LEVEMIR FLEXTOUCH) 100 UNIT/ML injection pen INJECT 11 UNITS UNDER THE SKIN AT BEDTIME 15 mL 2    omeprazole (PRILOSEC) 40 MG delayed release capsule Take 1 capsule by mouth daily 90 capsule 3    Multiple Vitamins-Minerals (MULTIVITAMIN & MINERAL PO) Take by mouth      magnesium gluconate (MAGONATE) 500 MG tablet  (Patient not taking: Reported on 2021)      Exenatide ER (BYDUREON BCISE) 2 MG/0.85ML AUIJ Inject 2 mg into the skin once a week (Patient not taking: Reported on 2021) 12 pen 3    lisinopril-hydroCHLOROthiazide (PRINZIDE;ZESTORETIC) 20-12.5 MG per tablet TAKE 1 TABLET DAILY (Patient not taking: Reported on 2021) 90 tablet 3    glucose monitoring kit (FREESTYLE) monitoring kit 1 kit by Does not apply route daily 1 kit 0    blood glucose monitor strips Test 1-2  times a day & as needed for symptoms of irregular blood glucose. Dispense sufficient amount for indicated testing frequency plus additional to accommodate PRN testing needs.  100 strip 0    Lancets MISC 1 each by Does not apply route 2 times daily 100 each 5    metFORMIN (GLUCOPHAGE-XR) 750 MG extended release tablet TAKE 1 TABLET TWICE A DAY (Patient not taking: Reported on 6/9/2021) 180 tablet 3    meloxicam (MOBIC) 15 MG tablet TAKE 1 TABLET DAILY (Patient not taking: Reported on 6/9/2021) 90 tablet 3    Insulin Pen Needle (B-D ULTRAFINE III SHORT PEN) 31G X 8 MM MISC INJECT UNDER THE SKIN DAILY 90 each 3    aspirin 81 MG tablet Take 81 mg by mouth daily       No current facility-administered medications for this visit. Allergies   Allergen Reactions    Simvastatin      Leg cramps       Health Maintenance   Topic Date Due    Hepatitis C screen  Never done    Shingles Vaccine (1 of 2) Never done    Diabetic microalbuminuria test  03/10/2021    HIV screen  11/17/2027 (Originally 9/13/1970)    Lipid screen  10/23/2021    Diabetic foot exam  10/29/2021    Pneumococcal 65+ years Vaccine (2 of 2 - PPSV23) 10/29/2021    Breast cancer screen  02/17/2022    Diabetic retinal exam  03/23/2022    A1C test (Diabetic or Prediabetic)  06/07/2022    Potassium monitoring  06/07/2022    Creatinine monitoring  06/07/2022    Cervical cancer screen  04/28/2026    Colon cancer screen colonoscopy  01/08/2028    DTaP/Tdap/Td vaccine (2 - Td or Tdap) 10/29/2030    DEXA (modify frequency per FRAX score)  Completed    Flu vaccine  Completed    COVID-19 Vaccine  Completed    Hepatitis A vaccine  Aged Out    Hib vaccine  Aged Out    Meningococcal (ACWY) vaccine  Aged Out       Subjective:      Review of Systems    Objective:     BP (!) 160/86 (Site: Left Upper Arm, Position: Sitting, Cuff Size: Medium Adult)   Pulse 80   Wt 140 lb 14.4 oz (63.9 kg)   SpO2 98%   BMI 27.98 kg/m²     Physical Exam  Vitals and nursing note reviewed. Constitutional:       Appearance: Normal appearance. She is well-developed. HENT:      Head: Normocephalic and atraumatic. Mouth/Throat:      Pharynx: No oropharyngeal exudate. Eyes:      Extraocular Movements: Extraocular movements intact.       Conjunctiva/sclera: Conjunctivae normal.      Pupils: Pupils are diet.  Will gradually reintroduce her other medications starting with the lisinopril if the creatinine remains under 2. We will then restart the Bydureon and if tolerating this well then would gradually restart her Metformin. Lab Results   Component Value Date    WBC 9.6 06/07/2021    HGB 13.4 06/07/2021    HCT 39.3 06/07/2021    .0 06/07/2021    CHOL 223 10/23/2020    TRIG 230 10/23/2020    HDL 46 10/23/2020    ALT 49 (H) 06/07/2021    AST 41 (H) 06/07/2021     06/07/2021    K 3.8 06/07/2021    CL 98 06/07/2021    CREATININE 1.2 (H) 06/07/2021    BUN 42 (H) 06/07/2021    CO2 22 06/07/2021    GLUF 133 (H) 03/10/2020    LABA1C 7.3 (H) 06/07/2021    LABA1C 7.0 04/28/2021    LABA1C 7.3 10/29/2020    LABMICR 15 03/10/2020       Return for As scheduled. Patient given educational materials - see patientinstructions. Discussed use, benefit, and side effects of prescribed medications. All patient questions answered. Pt voiced understanding. Reviewed health maintenance. Instructed to continue current medications, diet andexercise. Patient agreed with treatment plan. Follow up as directed.      (Please note that portions of this note were completed with a voice-recognition program. Efforts were made to edit the dictation but occasionally words are mis-transcribed.)    Electronically signed by Fab Dinh MD on 6/9/2021

## 2021-06-21 DIAGNOSIS — Z79.4 TYPE 2 DIABETES MELLITUS WITHOUT COMPLICATION, WITH LONG-TERM CURRENT USE OF INSULIN (HCC): ICD-10-CM

## 2021-06-21 DIAGNOSIS — E11.9 TYPE 2 DIABETES MELLITUS WITHOUT COMPLICATION, WITH LONG-TERM CURRENT USE OF INSULIN (HCC): ICD-10-CM

## 2021-06-21 RX ORDER — PEN NEEDLE, DIABETIC 31 GX5/16"
NEEDLE, DISPOSABLE MISCELLANEOUS
Qty: 90 EACH | Refills: 3 | Status: SHIPPED | OUTPATIENT
Start: 2021-06-21

## 2021-06-21 NOTE — TELEPHONE ENCOUNTER
Brian Conway is requesting a refill on the following medication(s):  Requested Prescriptions     Pending Prescriptions Disp Refills    Insulin Pen Needle (B-D ULTRAFINE III SHORT PEN) 31G X 8 MM MISC 90 each 3     Sig: INJECT UNDER THE SKIN DAILY       Last Visit Date (If Applicable):  1/0/7341    Next Visit Date:    10/28/2021

## 2021-08-02 DIAGNOSIS — E78.00 PURE HYPERCHOLESTEROLEMIA: ICD-10-CM

## 2021-08-02 DIAGNOSIS — M19.90 ARTHRITIS: ICD-10-CM

## 2021-08-02 NOTE — TELEPHONE ENCOUNTER
Alberta Harp is requesting a refill on the following medication(s):  Requested Prescriptions     Pending Prescriptions Disp Refills    meloxicam (MOBIC) 15 MG tablet [Pharmacy Med Name: MELOXICAM TABS 15MG] 90 tablet 3     Sig: TAKE 1 TABLET DAILY    omeprazole (PRILOSEC) 40 MG delayed release capsule [Pharmacy Med Name: OMEPRAZOLE DR CAPS 40MG] 90 capsule 3     Sig: TAKE 1 CAPSULE DAILY       Last Visit Date (If Applicable):  Visit date not found    Next Visit Date:    Visit date not found

## 2021-08-04 RX ORDER — OMEPRAZOLE 40 MG/1
CAPSULE, DELAYED RELEASE ORAL
Qty: 90 CAPSULE | Refills: 3 | Status: SHIPPED | OUTPATIENT
Start: 2021-08-04 | End: 2022-05-19 | Stop reason: SDUPTHER

## 2021-08-04 RX ORDER — MELOXICAM 15 MG/1
TABLET ORAL
Qty: 90 TABLET | Refills: 3 | Status: SHIPPED | OUTPATIENT
Start: 2021-08-04 | End: 2022-08-22 | Stop reason: SDUPTHER

## 2021-09-11 DIAGNOSIS — E11.9 TYPE 2 DIABETES MELLITUS WITHOUT COMPLICATION, WITH LONG-TERM CURRENT USE OF INSULIN (HCC): ICD-10-CM

## 2021-09-11 DIAGNOSIS — Z79.4 TYPE 2 DIABETES MELLITUS WITHOUT COMPLICATION, WITH LONG-TERM CURRENT USE OF INSULIN (HCC): ICD-10-CM

## 2021-09-11 NOTE — TELEPHONE ENCOUNTER
Coco Russell is calling to request a refill on the following medication(s):  Requested Prescriptions     Pending Prescriptions Disp Refills    metFORMIN (GLUCOPHAGE-XR) 750 MG extended release tablet [Pharmacy Med Name: METFORMIN HCL ER TABS 750MG] 180 tablet 3     Sig: TAKE 1 TABLET TWICE A DAY       Last Visit Date (If Applicable):  2/7/9440    Next Visit Date:    10/28/2021

## 2021-09-13 RX ORDER — METFORMIN HYDROCHLORIDE 750 MG/1
TABLET, EXTENDED RELEASE ORAL
Qty: 180 TABLET | Refills: 3 | Status: SHIPPED | OUTPATIENT
Start: 2021-09-13 | End: 2022-01-31 | Stop reason: ALTCHOICE

## 2021-09-28 DIAGNOSIS — E11.9 TYPE 2 DIABETES MELLITUS WITHOUT COMPLICATION, WITH LONG-TERM CURRENT USE OF INSULIN (HCC): ICD-10-CM

## 2021-09-28 DIAGNOSIS — Z79.4 TYPE 2 DIABETES MELLITUS WITHOUT COMPLICATION, WITH LONG-TERM CURRENT USE OF INSULIN (HCC): ICD-10-CM

## 2021-09-29 RX ORDER — INSULIN DETEMIR 100 [IU]/ML
INJECTION, SOLUTION SUBCUTANEOUS
Qty: 15 ML | Refills: 2 | Status: SHIPPED | OUTPATIENT
Start: 2021-09-29 | End: 2022-02-01 | Stop reason: ALTCHOICE

## 2021-09-29 NOTE — TELEPHONE ENCOUNTER
Stella Richard is requesting a refill on the following medication(s):  Requested Prescriptions     Pending Prescriptions Disp Refills    LEVEMIR FLEXTOUCH 100 UNIT/ML injection pen [Pharmacy Med Name: Braden Connors PEN 3ML 5'S 100U/ML] 15 mL 2     Sig: INJECT 11 UNITS UNDER THE SKIN AT BEDTIME       Last Visit Date (If Applicable):  9/5/8039    Next Visit Date:    10/28/2021

## 2021-10-25 DIAGNOSIS — I10 ESSENTIAL HYPERTENSION: ICD-10-CM

## 2021-10-25 LAB
ALBUMIN/GLOBULIN RATIO: 1.69 G/DL
ALBUMIN: 4.9 G/DL (ref 3.5–5)
ALP BLD-CCNC: 63 UNITS/L (ref 38–126)
ALT SERPL-CCNC: 22 UNITS/L (ref 4–35)
ANION GAP SERPL CALCULATED.3IONS-SCNC: 13.5 MMOL/L
AST SERPL-CCNC: 27 UNITS/L (ref 14–36)
BILIRUB SERPL-MCNC: 0.5 MG/DL (ref 0.2–1.3)
BUN BLDV-MCNC: 24 MG/DL (ref 7–17)
CALCIUM SERPL-MCNC: 10.8 MG/DL (ref 8.4–10.2)
CHLORIDE BLD-SCNC: 104 MMOL/L (ref 98–120)
CHOLESTEROL/HDL RATIO: 2.59 RATIO (ref 0–4.5)
CHOLESTEROL: 150 MG/DL (ref 50–200)
CO2: 27 MMOL/L (ref 22–31)
CREAT SERPL-MCNC: 1.1 MG/DL (ref 0.5–1)
CREATININE, RANDOM URINE: 107.4 MG/DL (ref 20–370)
GFR CALCULATED: 52.8
GLOBULIN: 2.9 G/DL
GLUCOSE: 136 MG/DL (ref 65–105)
HDLC SERPL-MCNC: 58 MG/DL (ref 36–68)
LDL CHOLESTEROL CALCULATED: 51.8 MG/DL (ref 0–160)
MAGNESIUM: 1.3 MG/DL (ref 1.6–2.3)
MICROALBUMIN UR-MCNC: 14.8 MG/DL (ref 0–1.7)
MICROALBUMIN/CREAT UR-RTO: 137.8
POTASSIUM SERPL-SCNC: 5.5 MMOL/L (ref 3.6–5)
SODIUM BLD-SCNC: 139 MMOL/L (ref 135–145)
TOTAL PROTEIN, SERUM: 7.8 G/DL (ref 6.3–8.2)
TRIGL SERPL-MCNC: 201 MG/DL (ref 10–250)
VITAMIN B-12: 359 PG/ML (ref 239–931)
VLDLC SERPL CALC-MCNC: 40 MG/DL (ref 0–50)

## 2021-10-25 RX ORDER — LISINOPRIL AND HYDROCHLOROTHIAZIDE 20; 12.5 MG/1; MG/1
TABLET ORAL
Qty: 90 TABLET | Refills: 3 | Status: SHIPPED | OUTPATIENT
Start: 2021-10-25 | End: 2021-10-28 | Stop reason: ALTCHOICE

## 2021-10-28 ENCOUNTER — OFFICE VISIT (OUTPATIENT)
Dept: FAMILY MEDICINE CLINIC | Age: 66
End: 2021-10-28
Payer: COMMERCIAL

## 2021-10-28 VITALS
HEART RATE: 73 BPM | WEIGHT: 145 LBS | BODY MASS INDEX: 28.47 KG/M2 | HEIGHT: 60 IN | RESPIRATION RATE: 20 BRPM | OXYGEN SATURATION: 97 % | DIASTOLIC BLOOD PRESSURE: 80 MMHG | SYSTOLIC BLOOD PRESSURE: 110 MMHG

## 2021-10-28 DIAGNOSIS — E11.9 TYPE 2 DIABETES MELLITUS WITHOUT COMPLICATION, WITH LONG-TERM CURRENT USE OF INSULIN (HCC): Primary | ICD-10-CM

## 2021-10-28 DIAGNOSIS — Z79.4 TYPE 2 DIABETES MELLITUS WITHOUT COMPLICATION, WITH LONG-TERM CURRENT USE OF INSULIN (HCC): Primary | ICD-10-CM

## 2021-10-28 DIAGNOSIS — E83.52 HYPERCALCEMIA: ICD-10-CM

## 2021-10-28 DIAGNOSIS — I10 PRIMARY HYPERTENSION: ICD-10-CM

## 2021-10-28 LAB — HBA1C MFR BLD: 6.9 %

## 2021-10-28 PROCEDURE — 99214 OFFICE O/P EST MOD 30 MIN: CPT | Performed by: FAMILY MEDICINE

## 2021-10-28 PROCEDURE — 1090F PRES/ABSN URINE INCON ASSESS: CPT | Performed by: FAMILY MEDICINE

## 2021-10-28 PROCEDURE — 3017F COLORECTAL CA SCREEN DOC REV: CPT | Performed by: FAMILY MEDICINE

## 2021-10-28 PROCEDURE — G8427 DOCREV CUR MEDS BY ELIG CLIN: HCPCS | Performed by: FAMILY MEDICINE

## 2021-10-28 PROCEDURE — 3044F HG A1C LEVEL LT 7.0%: CPT | Performed by: FAMILY MEDICINE

## 2021-10-28 PROCEDURE — 1036F TOBACCO NON-USER: CPT | Performed by: FAMILY MEDICINE

## 2021-10-28 PROCEDURE — G8484 FLU IMMUNIZE NO ADMIN: HCPCS | Performed by: FAMILY MEDICINE

## 2021-10-28 PROCEDURE — G8417 CALC BMI ABV UP PARAM F/U: HCPCS | Performed by: FAMILY MEDICINE

## 2021-10-28 PROCEDURE — 2022F DILAT RTA XM EVC RTNOPTHY: CPT | Performed by: FAMILY MEDICINE

## 2021-10-28 PROCEDURE — 4040F PNEUMOC VAC/ADMIN/RCVD: CPT | Performed by: FAMILY MEDICINE

## 2021-10-28 PROCEDURE — 1123F ACP DISCUSS/DSCN MKR DOCD: CPT | Performed by: FAMILY MEDICINE

## 2021-10-28 PROCEDURE — 83036 HEMOGLOBIN GLYCOSYLATED A1C: CPT | Performed by: FAMILY MEDICINE

## 2021-10-28 PROCEDURE — G8399 PT W/DXA RESULTS DOCUMENT: HCPCS | Performed by: FAMILY MEDICINE

## 2021-10-28 RX ORDER — AMLODIPINE BESYLATE 2.5 MG/1
2.5 TABLET ORAL DAILY
Qty: 30 TABLET | Refills: 5 | Status: SHIPPED | OUTPATIENT
Start: 2021-10-28 | End: 2022-03-18 | Stop reason: SDUPTHER

## 2021-10-28 RX ORDER — LISINOPRIL 10 MG/1
10 TABLET ORAL DAILY
Qty: 30 TABLET | Refills: 5 | Status: SHIPPED | OUTPATIENT
Start: 2021-10-28 | End: 2022-03-18 | Stop reason: SDUPTHER

## 2021-10-28 NOTE — PROGRESS NOTES
[facility]  [address]  Dept: [phone]  Dept T:[phone]    Florencia Pinto is a 77 y.o. female who presents today for her medical conditions/complaints as notedbelow. Florencia Pinto is c/o of Hypertension (6 month f/u ), Diabetes (6 month f/u ), and Back Pain (hard to walk now )      HPI:     HPI    Florencia Pinto is a 77 y.o. female who presents for follow-up of hypertension, diabetes, and hyperlipidemia. She indicates that she is feeling well and denies any symptoms referable to her elevated blood pressure or diabetes. Specifically denies chest pain, palpitations, dyspnea, peripheral edema, thirst, frequent urination, and blurred vision. Current medication regimen is as listed below. She denies any side effects of medication, and has been taking it regularly. Home glucose readings have been OK morning is . Checks blood sugars twice daily usually -- occasionally more if sx. Last eye exam was last year-- scheduled withMell[facility]. Diabetic complications includenone. shehas not been exercising regularly due to the hip pain. Hasbeen following a diabeticdiet. Has been having pain in her right leg-- outer thigh all the way down her leg into her calf. Into the ankle. Occasionally lower back. Feels better at times and other times is still painful. Driving is also painful. Aching, leg is never really sore. Has tried some tylenol ER. Helps some. Has cut down on her walking because of the pain. Had been walking 2-3 miles and now only around the hospital and has to stop. Is still taking the magnesium regularly.       BP Readings from Last 3 Encounters:   10/28/21 110/80   06/09/21 (!) 160/86   04/28/21 130/82          (goal 120/80)    Wt Readings from Last 3 Encounters:   10/28/21 145 lb (65.8 kg)   06/09/21 140 lb 14.4 oz (63.9 kg)   04/28/21 150 lb (68 kg)        Past Medical History:   Diagnosis Date    Hyperlipidemia  Hypertension     Nephrolithiasis     Type 2 diabetes mellitus without complication [facility])       Past Surgical History:   Procedure Laterality Date    CHOLECYSTECTOMY      COLONOSCOPY  2018    Dr Flavio Prather- negative/normal        History reviewed. No pertinent family history. Social History     Tobacco Use    Smoking status: Former Smoker     Packs/day: 0.50     Years: 4.00     Pack years: 2.00     Quit date: 1997     Years since quittin.4    Smokeless tobacco: Never Used   Substance Use Topics    Alcohol use: Yes      Current Outpatient Medications   Medication Sig Dispense Refill    lisinopril (PRINIVIL;ZESTRIL) 10 MG tablet Take 1 tablet by mouth daily 30 tablet 5    amLODIPine (NORVASC) 2.5 MG tablet Take 1 tablet by mouth daily 30 tablet 5    LEVEMIR FLEXTOUCH 100 UNIT/ML injection pen INJECT 11 UNITS UNDER THE SKIN AT BEDTIME 15 mL 2    metFORMIN (GLUCOPHAGE-XR) 750 MG extended release tablet TAKE 1 TABLET TWICE A  tablet 3    meloxicam (MOBIC) 15 MG tablet TAKE 1 TABLET DAILY 90 tablet 3    omeprazole (PRILOSEC) 40 MG delayed release capsule TAKE 1 CAPSULE DAILY 90 capsule 3    Insulin Pen Needle (B-D ULTRAFINE III SHORT PEN) 31G X 8 MM MISC INJECT UNDER THE SKIN DAILY 90 each 3    magnesium gluconate (MAGONATE) 500 MG tablet       Exenatide ER (BYDUREON BCISE) 2 MG/0.85ML AUIJ Inject 2 mg into the skin once a week 12 pen 3    rosuvastatin (CRESTOR) 10 MG tablet Take 1 tablet by mouth nightly 90 tablet 3    glucose monitoring kit (FREESTYLE) monitoring kit 1 kit by Does not apply route daily 1 kit 0    blood glucose monitor strips Test 1-2  times a day & as needed for symptoms of irregular blood glucose. Dispense sufficient amount for indicated testing frequency plus additional to accommodate PRN testing needs.  100 strip 0    Lancets MISC 1 each by Does not apply route 2 times daily 100 each 5    aspirin 81 MG tablet Take 81 mg by mouth daily      Multiple Vitamins-Minerals (MULTIVITAMIN & MINERAL PO) Take by mouth       No current facility-administered medications for this visit. Allergies   Allergen Reactions    Simvastatin      Leg cramps       Health Maintenance   Topic Date Due    Hepatitis C screen  Never done    Shingles Vaccine (1 of 2) Never done    Flu vaccine (1) 09/01/2021    Pneumococcal 65+ years Vaccine (2 of 2 - PPSV23) 10/29/2021    Breast cancer screen  02/17/2022    Diabetic retinal exam  03/23/2022    Diabetic microalbuminuria test  10/25/2022    Lipid screen  10/25/2022    Potassium monitoring  10/25/2022    Creatinine monitoring  10/25/2022    Diabetic foot exam  10/28/2022    A1C test (Diabetic or Prediabetic)  10/28/2022    Colon cancer screen colonoscopy  01/08/2028    DTaP/Tdap/Td vaccine (2 - Td or Tdap) 10/29/2030    DEXA (modify frequency per FRAX score)  Completed    COVID-19 Vaccine  Completed    Hepatitis A vaccine  Aged Out    Hib vaccine  Aged Out    Meningococcal (ACWY) vaccine  Aged Out       Subjective:      Review of Systems    Objective:     /80 (Site: Left Upper Arm, Position: Sitting, Cuff Size: Large Adult)   Pulse 73   Resp 20   Ht 4' 11.5\" (1.511 m)   Wt 145 lb (65.8 kg)   SpO2 97%   BMI 28.80 kg/m²     Physical Exam  Vitals and nursing note reviewed. Constitutional:       Appearance: Normal appearance. She is well-developed. HENT:      Head: Normocephalic and atraumatic. Eyes:      Extraocular Movements: Extraocular movements intact. Conjunctiva/sclera: Conjunctivae normal.      Pupils: Pupils are equal, round, and reactive to light. Neck:      Thyroid: No thyromegaly. Vascular: No JVD. Cardiovascular:      Rate and Rhythm: Normal rate and regular rhythm. Pulses: Normal pulses. Heart sounds: Normal heart sounds. No murmur heard. No friction rub. No gallop. Pulmonary:      Effort: Pulmonary effort is normal. No respiratory distress.       Breath sounds: Normal breath sounds. Musculoskeletal:         General: No swelling, tenderness or deformity. Cervical back: Normal range of motion and neck supple. Right lower leg: No edema. Left lower leg: No edema. Comments: Tender along the right IT band and very tight, tender on the ischium as well on the right and the greater trochanter. Lymphadenopathy:      Cervical: No cervical adenopathy. Skin:     General: Skin is warm. Neurological:      General: No focal deficit present. Mental Status: She is alert and oriented to person, place, and time. Cranial Nerves: No cranial nerve deficit. Sensory: No sensory deficit. Psychiatric:         Mood and Affect: Mood normal.         Behavior: Behavior normal.         Thought Content: Thought content normal.         Judgment: Judgment normal.         Diabetic Foot Exam    Deformities: Yes  hammertoe  Pulses:  normal,   Edema: normal  Skin lesions: normal  Callous:  No  Nails: normal    Sensory exam  Monofilament Sensation Left Foot: 10/10   Monofilament Sensation Right Foot: 10/10       Assessment/Plan:     1. Type 2 diabetes mellitus without complication, with long-term current use of insulin ([facility])  -     POCT glycosylated hemoglobin (Hb A1C)  -      DIABETES FOOT EXAM  -     Basic Metabolic Panel; Future  2. Hypercalcemia  -     PTH, Intact With Ionized Calcium; Future  -     Vitamin D 25 Hydroxy; Future  3. Primary hypertension  -     lisinopril (PRINIVIL;ZESTRIL) 10 MG tablet; Take 1 tablet by mouth daily, Disp-30 tablet, R-5Normal  -     amLODIPine (NORVASC) 2.5 MG tablet; Take 1 tablet by mouth daily, Disp-30 tablet, R-5Normal      Add in a B vitamin complex for the drifting down B 12 levels. Change the lisinopril to 10 mg once daily -- this will cut out the HCTZ that can be causing the elevation of the calcium and hopefully correct the potassium issue. Recheck the calcium with the PTH and Vit D level in 1 month.  Will also check the potassium at that time with the magnesium. Hold the metformin for 2 weeks. See how the loose bowels are at that time. If still having problems then we may need to consider a different magnesium option. Call and let me know. HEP for the hip and PT referral if not improving. Lab Results   Component Value Date    WBC 9.6 06/07/2021    HGB 13.4 06/07/2021    HCT 39.3 06/07/2021    .0 06/07/2021    CHOL 150 10/25/2021    TRIG 201 10/25/2021    HDL 58 10/25/2021    ALT 22 10/25/2021    AST 27 10/25/2021     10/25/2021    K 5.5 (H) 10/25/2021     10/25/2021    CREATININE 1.1 (H) 10/25/2021    BUN 24 (H) 10/25/2021    CO2 27 10/25/2021    GLUF 133 (H) 03/10/2020    LABA1C 6.9 10/28/2021    LABA1C 7.3 (H) 06/07/2021    LABA1C 7.0 04/28/2021    LABMICR 14.8 (H) 10/25/2021       Return in about 3 months (around 1/28/2022) for DM follow up, HTN. Patient given educational materials - see patientinstructions. Discussed use, benefit, and side effects of prescribed medications. All patient questions answered. Pt voiced understanding. Reviewed health maintenance. Instructed to continue current medications, diet andexercise. Patient agreed with treatment plan. Follow up as directed.      (Please note that portions of this note were completed with a voice-recognition program. Efforts were made to edit the dictation but occasionally words are mis-transcribed.)    Electronically signed by Mary Anne Keller MD on 10/28/2021

## 2021-10-28 NOTE — PATIENT INSTRUCTIONS
Add in a B vitamin complex for the drifting down B 12 levels. Change the lisinopril to 10 mg once daily -- this will cut out the HCTZ that can be causing the elevation of the calcium and hopefully correct the potassium issue. Recheck the calcium with the PTH and Vit D level in 1 month. Will also check the potassium at that time with the magnesium. Hold the metformin for 2 weeks. See how the loose bowels are at that time. If still having problems then we may need to consider a different magnesium option. Call and let me know. Patient Education        Iliotibial Band Syndrome: Exercises  Introduction  Here are some examples of exercises for you to try. The exercises may be suggested for a condition or for rehabilitation. Start each exercise slowly. Ease off the exercises if you start to have pain. You will be told when to start these exercises and which ones will work best for you. How to do the exercises  Iliotibial band stretch    1. Lean sideways against a wall. If you are not steady on your feet, hold on to a chair or counter. 2. Stand on the leg with the affected hip, with that leg close to the wall. Then cross your other leg in front of it. 3. Let your affected hip drop out to the side of your body and against the wall. Then lean away from your affected hip until you feel a stretch. 4. Hold the stretch for 15 to 30 seconds. 5. Repeat 2 to 4 times. Piriformis stretch    1. Lie on your back with your legs straight. 2. Lift your affected leg and bend your knee. With your opposite hand, reach across your body, and then gently pull your knee toward your opposite shoulder. 3. Hold the stretch for 15 to 30 seconds. 4. Repeat 2 to 4 times. Hamstring wall stretch    1. Lie on your back in a doorway, with your good leg through the open door. 2. Slide your affected leg up the wall to straighten your knee. You should feel a gentle stretch down the back of your leg.   3. Hold the stretch for at least 1 minute to begin. Then try to lengthen the time you hold the stretch to as long as 6 minutes. 4. Repeat 2 to 4 times. 5. If you do not have a place to do this exercise in a doorway, there is another way to do it:  6. Lie on your back, and bend the knee of your affected leg. 7. Loop a towel under the ball and toes of that foot, and hold the ends of the towel in your hands. 8. Straighten your knee, and slowly pull back on the towel. You should feel a gentle stretch down the back of your leg. 9. Hold the stretch for 15 to 30 seconds. Or even better, hold the stretch for 1 minute if you can. 10. Repeat 2 to 4 times. 1. Do not arch your back. 2. Do not bend either knee. 3. Keep one heel touching the floor and the other heel touching the wall. Do not point your toes. Follow-up care is a key part of your treatment and safety. Be sure to make and go to all appointments, and call your doctor if you are having problems. It's also a good idea to know your test results and keep a list of the medicines you take. Where can you learn more? Go to https://BuzzFeed.[employer]. org and sign in to your V[employer] account. Enter P252 in the [facility] box to learn more about \"Iliotibial Band Syndrome: Exercises. \"     If you do not have an account, please click on the \"Sign Up Now\" link. Current as of: July 1, 2021               Content Version: 13.0  © 2006-2021 Healthwise, Incorporated. Care instructions adapted under license by [facility] ([facility]). If you have questions about a medical condition or this instruction, always ask your healthcare professional. [place] [address] any warranty or liability for your use of this information. Patient Education        Hip Bursitis: Exercises  Introduction  Here are some examples of exercises for you to try. The exercises may be suggested for a condition or for rehabilitation. Start each exercise slowly.  Ease off the exercises if you start to have pain. You will be told when to start these exercises and which ones will work best for you. How to do the exercises  Hip rotator stretch    1. Lie on your back with both knees bent and your feet flat on the floor. 2. Put the ankle of your affected leg on your opposite thigh near your knee. 3. Use your hand to gently push your knee away from your body until you feel a gentle stretch around your hip. 4. Hold the stretch for 15 to 30 seconds. 5. Repeat 2 to 4 times. 6. Repeat steps 1 through 5, but this time use your hand to gently pull your knee toward your opposite shoulder. Iliotibial band stretch    1. Lean sideways against a wall. If you are not steady on your feet, hold on to a chair or counter. 2. Stand on the leg with the affected hip, with that leg close to the wall. Then cross your other leg in front of it. 3. Let your affected hip drop out to the side of your body and against wall. Then lean away from your affected hip until you feel a stretch. 4. Hold the stretch for 15 to 30 seconds. 5. Repeat 2 to 4 times. Straight-leg raises to the outside    1. Lie on your side, with your affected hip on top. 2. Tighten the front thigh muscles of your top leg to keep your knee straight. 3. Keep your hip and your leg straight in line with the rest of your body, and keep your knee pointing forward. Do not drop your hip back. 4. Lift your top leg straight up toward the ceiling, about 12 inches off the floor. Hold for about 6 seconds, then slowly lower your leg. 5. Repeat 8 to 12 times. Clamshell    1. Lie on your side, with your affected hip on top and your head propped on a pillow. Keep your feet and knees together and your knees bent. 2. Raise your top knee, but keep your feet together. Do not let your hips roll back. Your legs should open up like a clamshell. 3. Hold for 6 seconds. 4. Slowly lower your knee back down. Rest for 10 seconds. 5. Repeat 8 to 12 times.   Follow-up care is a key part of your treatment and safety. Be sure to make and go to all appointments, and call your doctor if you are having problems. It's also a good idea to know your test results and keep a list of the medicines you take. Where can you learn more? Go to https://chnatalie.[employer]. org and sign in to your takokatt account. Enter U750 in the [employer] box to learn more about \"Hip Bursitis: Exercises. \"     If you do not have an account, please click on the \"Sign Up Now\" link. Current as of: July 1, 2021               Content Version: 13.0  © 8173-8878 Healthwise, Incorporated. Care instructions adapted under license by [facility] ([facility]). If you have questions about a medical condition or this instruction, always ask your healthcare professional. [address] any warranty or liability for your use of this information.

## 2021-11-29 DIAGNOSIS — Z79.4 TYPE 2 DIABETES MELLITUS WITHOUT COMPLICATION, WITH LONG-TERM CURRENT USE OF INSULIN (HCC): ICD-10-CM

## 2021-11-29 DIAGNOSIS — E11.9 TYPE 2 DIABETES MELLITUS WITHOUT COMPLICATION, WITH LONG-TERM CURRENT USE OF INSULIN (HCC): ICD-10-CM

## 2021-11-29 NOTE — TELEPHONE ENCOUNTER
Aman Wolf is requesting a refill on the following medication(s):  Requested Prescriptions     Pending Prescriptions Disp Refills    blood glucose monitor strips 100 strip 0     Sig: Test 1-2  times a day & as needed for symptoms of irregular blood glucose. Dispense sufficient amount for indicated testing frequency plus additional to accommodate PRN testing needs.        Last Visit Date (If Applicable):  91/43/5697    Next Visit Date:    1/31/2022

## 2021-11-30 RX ORDER — GLUCOSAMINE HCL/CHONDROITIN SU 500-400 MG
CAPSULE ORAL
Qty: 100 STRIP | Refills: 3 | Status: SHIPPED | OUTPATIENT
Start: 2021-11-30 | End: 2022-05-19 | Stop reason: SDUPTHER

## 2021-12-23 RX ORDER — MAGNESIUM GLUCONATE 27 MG(500)
TABLET ORAL
Qty: 90 TABLET | Refills: 3 | Status: SHIPPED | OUTPATIENT
Start: 2021-12-23 | End: 2022-04-21 | Stop reason: SDUPTHER

## 2021-12-29 LAB
ANION GAP SERPL CALCULATED.3IONS-SCNC: 8.2 MMOL/L
BUN BLDV-MCNC: 28 MG/DL (ref 7–17)
CALCIUM IONIZED: 1.31 MMOL/L (ref 1.13–1.33)
CALCIUM SERPL-MCNC: 10.4 MG/DL (ref 8.4–10.2)
CHLORIDE BLD-SCNC: 104 MMOL/L (ref 98–120)
CO2: 26 MMOL/L (ref 22–31)
CREAT SERPL-MCNC: 0.9 MG/DL (ref 0.5–1)
GFR CALCULATED: > 60
GLUCOSE: 137 MG/DL (ref 65–105)
POTASSIUM SERPL-SCNC: 4.3 MMOL/L (ref 3.6–5)
PTH INTACT: 83.76 PG/ML (ref 15–65)
SODIUM BLD-SCNC: 138 MMOL/L (ref 135–145)
VITAMIN D 25-HYDROXY: 40.3 NG/ML (ref 30–100)

## 2022-01-31 ENCOUNTER — OFFICE VISIT (OUTPATIENT)
Dept: FAMILY MEDICINE CLINIC | Age: 67
End: 2022-01-31
Payer: COMMERCIAL

## 2022-01-31 VITALS
DIASTOLIC BLOOD PRESSURE: 74 MMHG | HEART RATE: 76 BPM | OXYGEN SATURATION: 98 % | BODY MASS INDEX: 26.41 KG/M2 | WEIGHT: 133 LBS | SYSTOLIC BLOOD PRESSURE: 134 MMHG

## 2022-01-31 DIAGNOSIS — I10 ESSENTIAL HYPERTENSION: ICD-10-CM

## 2022-01-31 DIAGNOSIS — I10 PRIMARY HYPERTENSION: ICD-10-CM

## 2022-01-31 DIAGNOSIS — R94.5 ABNORMAL LIVER FUNCTION: ICD-10-CM

## 2022-01-31 DIAGNOSIS — E78.00 PURE HYPERCHOLESTEROLEMIA: ICD-10-CM

## 2022-01-31 DIAGNOSIS — Z23 NEED FOR PROPHYLACTIC VACCINATION AGAINST STREPTOCOCCUS PNEUMONIAE (PNEUMOCOCCUS): ICD-10-CM

## 2022-01-31 DIAGNOSIS — Z79.4 TYPE 2 DIABETES MELLITUS WITHOUT COMPLICATION, WITH LONG-TERM CURRENT USE OF INSULIN (HCC): Primary | ICD-10-CM

## 2022-01-31 DIAGNOSIS — E11.9 TYPE 2 DIABETES MELLITUS WITHOUT COMPLICATION, WITH LONG-TERM CURRENT USE OF INSULIN (HCC): Primary | ICD-10-CM

## 2022-01-31 DIAGNOSIS — M19.90 ARTHRITIS: ICD-10-CM

## 2022-01-31 DIAGNOSIS — E21.3 HYPERPARATHYROIDISM (HCC): ICD-10-CM

## 2022-01-31 DIAGNOSIS — E83.52 HYPERCALCEMIA: ICD-10-CM

## 2022-01-31 LAB — HBA1C MFR BLD: 6.1 %

## 2022-01-31 PROCEDURE — G8427 DOCREV CUR MEDS BY ELIG CLIN: HCPCS | Performed by: FAMILY MEDICINE

## 2022-01-31 PROCEDURE — 2022F DILAT RTA XM EVC RTNOPTHY: CPT | Performed by: FAMILY MEDICINE

## 2022-01-31 PROCEDURE — 99214 OFFICE O/P EST MOD 30 MIN: CPT | Performed by: FAMILY MEDICINE

## 2022-01-31 PROCEDURE — G8484 FLU IMMUNIZE NO ADMIN: HCPCS | Performed by: FAMILY MEDICINE

## 2022-01-31 PROCEDURE — 3017F COLORECTAL CA SCREEN DOC REV: CPT | Performed by: FAMILY MEDICINE

## 2022-01-31 PROCEDURE — 1123F ACP DISCUSS/DSCN MKR DOCD: CPT | Performed by: FAMILY MEDICINE

## 2022-01-31 PROCEDURE — G8399 PT W/DXA RESULTS DOCUMENT: HCPCS | Performed by: FAMILY MEDICINE

## 2022-01-31 PROCEDURE — 4040F PNEUMOC VAC/ADMIN/RCVD: CPT | Performed by: FAMILY MEDICINE

## 2022-01-31 PROCEDURE — 90471 IMMUNIZATION ADMIN: CPT | Performed by: FAMILY MEDICINE

## 2022-01-31 PROCEDURE — 90732 PPSV23 VACC 2 YRS+ SUBQ/IM: CPT | Performed by: FAMILY MEDICINE

## 2022-01-31 PROCEDURE — G8417 CALC BMI ABV UP PARAM F/U: HCPCS | Performed by: FAMILY MEDICINE

## 2022-01-31 PROCEDURE — 3044F HG A1C LEVEL LT 7.0%: CPT | Performed by: FAMILY MEDICINE

## 2022-01-31 PROCEDURE — 83036 HEMOGLOBIN GLYCOSYLATED A1C: CPT | Performed by: FAMILY MEDICINE

## 2022-01-31 PROCEDURE — 1090F PRES/ABSN URINE INCON ASSESS: CPT | Performed by: FAMILY MEDICINE

## 2022-01-31 PROCEDURE — 1036F TOBACCO NON-USER: CPT | Performed by: FAMILY MEDICINE

## 2022-01-31 RX ORDER — ROSUVASTATIN CALCIUM 10 MG/1
10 TABLET, COATED ORAL NIGHTLY
Qty: 90 TABLET | Refills: 3 | Status: SHIPPED | OUTPATIENT
Start: 2022-01-31 | End: 2022-04-21 | Stop reason: SDUPTHER

## 2022-01-31 ASSESSMENT — PATIENT HEALTH QUESTIONNAIRE - PHQ9
SUM OF ALL RESPONSES TO PHQ QUESTIONS 1-9: 0
SUM OF ALL RESPONSES TO PHQ9 QUESTIONS 1 & 2: 0
SUM OF ALL RESPONSES TO PHQ QUESTIONS 1-9: 0
1. LITTLE INTEREST OR PLEASURE IN DOING THINGS: 0
SUM OF ALL RESPONSES TO PHQ QUESTIONS 1-9: 0
2. FEELING DOWN, DEPRESSED OR HOPELESS: 0
SUM OF ALL RESPONSES TO PHQ QUESTIONS 1-9: 0

## 2022-01-31 NOTE — PROGRESS NOTES
[facility]  [address]  Dept: [phone]  Dept S:[phone]    Aman Wolf is a 77 y.o. female who presents today for her medical conditions/complaints as notedbelow. Aman Wolf is c/o of Diabetes (3 mo follow up) and Hypertension (follow up after BP med change)      HPI:     HPI    Stopped Metformin at the last visit due to looser stools. She has not restarted the medication. She has felt so much better than she was previously. Bowels are much better than they were overall. Sugars have been about 120 at night and in the morning are 80-90. This is on the 8 units of the levemir. No sx of low blood sugar. Has been really watching her diet and starting to exercise regularly. Has lost weight and the hip pain has improved with the stretches. Labs in the fall showed a very low magnesium level at 1.3. Magnesium was added. She also had difficulty with elevated potassium level and a high calcium level. Her lisinopril was changed to 10 mg and the HCTZ component was dropped. Due to the elevated calcium level will other labs were ordered and Vitamin D level normal, ionized calcium normal although parathyroid level was 83.76.     BP Readings from Last 3 Encounters:   01/31/22 134/74   10/28/21 110/80   06/09/21 (!) 160/86          (goal 120/80)    Wt Readings from Last 3 Encounters:   01/31/22 133 lb (60.3 kg)   10/28/21 145 lb (65.8 kg)   06/09/21 140 lb 14.4 oz (63.9 kg)        Past Medical History:   Diagnosis Date    Hyperlipidemia     Hypertension     Nephrolithiasis     Type 2 diabetes mellitus without complication ([address]       Past Surgical History:   Procedure Laterality Date    CHOLECYSTECTOMY      COLONOSCOPY  01/2018    Dr Breann Dumas- negative/normal        History reviewed. No pertinent family history.     Social History     Tobacco Use    Smoking status: Former Smoker     Packs/day: 0.50     Years: 4.00     Pack years: 2.00     Quit date: 1997     Years since quittin.7    Smokeless tobacco: Never Used   Substance Use Topics    Alcohol use: Yes      Current Outpatient Medications   Medication Sig Dispense Refill    rosuvastatin (CRESTOR) 10 MG tablet Take 1 tablet by mouth nightly 90 tablet 3    magnesium gluconate (MAGONATE) 500 MG tablet TAKE 1 TABLET DAILY 90 tablet 3    lisinopril (PRINIVIL;ZESTRIL) 10 MG tablet Take 1 tablet by mouth daily 30 tablet 5    amLODIPine (NORVASC) 2.5 MG tablet Take 1 tablet by mouth daily 30 tablet 5    LEVEMIR FLEXTOUCH 100 UNIT/ML injection pen INJECT 11 UNITS UNDER THE SKIN AT BEDTIME (Patient taking differently: INJECT 8 UNITS UNDER THE SKIN AT BEDTIME) 15 mL 2    meloxicam (MOBIC) 15 MG tablet TAKE 1 TABLET DAILY 90 tablet 3    omeprazole (PRILOSEC) 40 MG delayed release capsule TAKE 1 CAPSULE DAILY 90 capsule 3    Exenatide ER (BYDUREON BCISE) 2 MG/0.85ML AUIJ Inject 2 mg into the skin once a week 12 pen 3    aspirin 81 MG tablet Take 81 mg by mouth daily      Multiple Vitamins-Minerals (MULTIVITAMIN & MINERAL PO) Take by mouth      blood glucose monitor strips Test 1-2  times a day & as needed for symptoms of irregular blood glucose. Dispense sufficient amount for indicated testing frequency plus additional to accommodate PRN testing needs. 100 strip 3    Insulin Pen Needle (B-D ULTRAFINE III SHORT PEN) 31G X 8 MM MISC INJECT UNDER THE SKIN DAILY 90 each 3    glucose monitoring kit (FREESTYLE) monitoring kit 1 kit by Does not apply route daily 1 kit 0    Lancets MISC 1 each by Does not apply route 2 times daily 100 each 5     No current facility-administered medications for this visit.      Allergies   Allergen Reactions    Metformin And Related      Diarrhea    Simvastatin      Leg cramps       Health Maintenance   Topic Date Due    Hepatitis C screen  Never done    Depression Screen  Never done    Shingles Vaccine (1 of 2) Never done    Pneumococcal 65+ years Vaccine (2 of 2 - PPSV23) 10/29/2021    Diabetic retinal exam  03/23/2022    Diabetic microalbuminuria test  10/25/2022    Lipid screen  10/25/2022    Diabetic foot exam  10/28/2022    Potassium monitoring  12/29/2022    Creatinine monitoring  12/29/2022    A1C test (Diabetic or Prediabetic)  01/31/2023    Breast cancer screen  12/02/2023    Colon cancer screen colonoscopy  01/08/2028    DTaP/Tdap/Td vaccine (2 - Td or Tdap) 10/29/2030    DEXA (modify frequency per FRAX score)  Completed    Flu vaccine  Completed    COVID-19 Vaccine  Completed    Hepatitis A vaccine  Aged Out    Hib vaccine  Aged Out    Meningococcal (ACWY) vaccine  Aged Out       Subjective:      Review of Systems    Objective:     /74 (Site: Left Upper Arm, Position: Sitting, Cuff Size: Large Adult)   Pulse 76   Wt 133 lb (60.3 kg)   SpO2 98%   BMI 26.41 kg/m²     Physical Exam  Vitals and nursing note reviewed. Constitutional:       Appearance: Normal appearance. She is well-developed. HENT:      Head: Normocephalic and atraumatic. Eyes:      Extraocular Movements: Extraocular movements intact. Conjunctiva/sclera: Conjunctivae normal.      Pupils: Pupils are equal, round, and reactive to light. Neck:      Thyroid: No thyromegaly. Vascular: No JVD. Cardiovascular:      Rate and Rhythm: Normal rate and regular rhythm. Pulses: Normal pulses. Heart sounds: Normal heart sounds. No murmur heard. No friction rub. No gallop. Pulmonary:      Effort: Pulmonary effort is normal. No respiratory distress. Breath sounds: Normal breath sounds. Musculoskeletal:         General: No swelling, tenderness or deformity. Cervical back: Normal range of motion and neck supple. Right lower leg: No edema. Left lower leg: No edema. Lymphadenopathy:      Cervical: No cervical adenopathy. Skin:     General: Skin is warm. Neurological:      General: No focal deficit present. Mental Status: She is alert and oriented to person, place, and time. Cranial Nerves: No cranial nerve deficit. Sensory: No sensory deficit. Psychiatric:         Mood and Affect: Mood normal.         Behavior: Behavior normal.         Thought Content: Thought content normal.         Judgment: Judgment normal.         Assessment/Plan:     1. Type 2 diabetes mellitus without complication, with long-term current use of insulin (HCC)  -     POCT glycosylated hemoglobin (Hb A1C)  2. Hypercalcemia  -     External Referral To Endocrinology  -     Basic Metabolic Panel; Future  -     PTH, Intact With Ionized Calcium; Future  3. Primary hypertension  -     Magnesium; Future  4. Essential hypertension  5. Arthritis  6. Pure hypercholesterolemia  -     rosuvastatin (CRESTOR) 10 MG tablet; Take 1 tablet by mouth nightly, Disp-90 tablet, R-3Normal  7. Abnormal liver function  8. Hyperparathyroidism [facility])  -     External Referral To Endocrinology  -     PTH, Intact With Ionized Calcium; Future  9. Need for prophylactic vaccination against Streptococcus pneumoniae (pneumococcus)    Stop the levemir and stay off of the metformin. Check with the insurance and see what the copays after deductible are for the GLP1 medications such as Bydureon, Trulicity, Rebylsius, Victoza, or similar. If these are cost prohibitive then let us know and we will help look into patient assistance. For the elevated calcium and parathyroid level we will go ahead and refer to endocrine. Continue on her current medications without the diuretic component and hopefully this will maintain a normal calcium level. Renal functions are stable so do not think that this is secondary hyperparathyroidism related to renal disease. The pain that she was experiencing in the knee due to the arthritis has significantly improved since she has been exercising regularly. No changes at this time and management.     Lab Results   Component Value Date WBC 9.6 06/07/2021    HGB 13.4 06/07/2021    HCT 39.3 06/07/2021    .0 06/07/2021    CHOL 150 10/25/2021    TRIG 201 10/25/2021    HDL 58 10/25/2021    ALT 22 10/25/2021    AST 27 10/25/2021     12/29/2021    K 4.3 12/29/2021     12/29/2021    CREATININE 0.9 12/29/2021    BUN 28 (H) 12/29/2021    CO2 26 12/29/2021    GLUF 133 (H) 03/10/2020    LABA1C 6.1 01/31/2022    LABA1C 6.9 10/28/2021    LABA1C 7.3 (H) 06/07/2021    LABMICR 14.8 (H) 10/25/2021       Return in about 4 months (around 5/31/2022) for DM follow up, Medication recheck. Multiple labs and other testing may have been ordered which may not be completely evident from the above note due to system interface incompatibilities. Patient given educational materials - see patientinstructions. Discussed use, benefit, and side effects of prescribed medications. All patient questions answered. Pt voiced understanding. Reviewed health maintenance. Instructed to continue current medications, diet andexercise. Patient agreed with treatment plan. Follow up as directed.      (Please note that portions of this note were completed with a voice-recognition program. Efforts were made to edit the dictation but occasionally words are mis-transcribed.)    Electronically signed by Eun Ca MD on 1/31/2022

## 2022-01-31 NOTE — PATIENT INSTRUCTIONS
Stop the levemir and stay off of the metformin. Check with the insurance and see what the copays after deductible are for the GLP1 medications such as Bydureon, Trulicity, Rebylsius, Victoza, or similar. If these are cost prohibitive then let us know and we will help look into patient assistance.

## 2022-03-17 DIAGNOSIS — I10 PRIMARY HYPERTENSION: ICD-10-CM

## 2022-03-17 NOTE — TELEPHONE ENCOUNTER
Aman Wolf is requesting a refill on the following medication(s):  Requested Prescriptions     Pending Prescriptions Disp Refills    lisinopril (PRINIVIL;ZESTRIL) 10 MG tablet 30 tablet 5     Sig: Take 1 tablet by mouth daily       Last Visit Date (If Applicable):  2/41/8200    Next Visit Date:    5/26/2022

## 2022-03-18 DIAGNOSIS — I10 PRIMARY HYPERTENSION: ICD-10-CM

## 2022-03-18 RX ORDER — LISINOPRIL 10 MG/1
10 TABLET ORAL DAILY
Qty: 30 TABLET | Refills: 5 | Status: SHIPPED | OUTPATIENT
Start: 2022-03-18 | End: 2022-08-22

## 2022-03-18 RX ORDER — AMLODIPINE BESYLATE 2.5 MG/1
2.5 TABLET ORAL DAILY
Qty: 90 TABLET | Refills: 3 | Status: SHIPPED | OUTPATIENT
Start: 2022-03-18

## 2022-03-18 NOTE — TELEPHONE ENCOUNTER
Stephanie Hicks is calling to request a refill on the following medication(s):  Requested Prescriptions     Pending Prescriptions Disp Refills    amLODIPine (NORVASC) 2.5 MG tablet 90 tablet 3     Sig: Take 1 tablet by mouth daily       Last Visit Date (If Applicable):  1/13/3126    Next Visit Date:    5/26/2022

## 2022-04-21 DIAGNOSIS — E78.00 PURE HYPERCHOLESTEROLEMIA: ICD-10-CM

## 2022-04-21 RX ORDER — ROSUVASTATIN CALCIUM 10 MG/1
10 TABLET, COATED ORAL NIGHTLY
Qty: 90 TABLET | Refills: 3 | Status: SHIPPED | OUTPATIENT
Start: 2022-04-21

## 2022-04-21 RX ORDER — EXENATIDE 2 MG/.85ML
2 INJECTION, SUSPENSION, EXTENDED RELEASE SUBCUTANEOUS WEEKLY
Qty: 12 PEN | Refills: 3 | Status: SHIPPED | OUTPATIENT
Start: 2022-04-21

## 2022-04-21 RX ORDER — MAGNESIUM GLUCONATE 27 MG(500)
TABLET ORAL
Qty: 90 TABLET | Refills: 3 | Status: SHIPPED | OUTPATIENT
Start: 2022-04-21 | End: 2022-05-19 | Stop reason: SDUPTHER

## 2022-04-21 NOTE — TELEPHONE ENCOUNTER
Marian Roman is requesting a refill on the following medication(s):  Requested Prescriptions     Pending Prescriptions Disp Refills    rosuvastatin (CRESTOR) 10 MG tablet 90 tablet 3     Sig: Take 1 tablet by mouth nightly    magnesium gluconate (MAGONATE) 500 MG tablet 90 tablet 3     Sig: TAKE 1 TABLET DAILY    Exenatide ER (BYDUREON BCISE) 2 MG/0.85ML AUIJ 12 pen 3     Sig: Inject 2 mg into the skin once a week       Last Visit Date (If Applicable):  5/33/8862    Next Visit Date:    5/26/2022

## 2022-05-18 LAB
ALBUMIN/GLOBULIN RATIO: 1.88 G/DL
ALBUMIN: 4.7 G/DL (ref 3.5–5)
ALP BLD-CCNC: 59 UNITS/L (ref 38–126)
ALT SERPL-CCNC: 21 UNITS/L (ref 4–35)
ANION GAP SERPL CALCULATED.3IONS-SCNC: 14.1 MMOL/L
AST SERPL-CCNC: 30 UNITS/L (ref 14–36)
BILIRUB SERPL-MCNC: 0.5 MG/DL (ref 0.2–1.3)
BUN BLDV-MCNC: 27 MG/DL (ref 7–17)
CALCIUM IONIZED: 1.35 MMOL/L (ref 1.13–1.33)
CALCIUM SERPL-MCNC: 10.2 MG/DL (ref 8.4–10.2)
CHLORIDE BLD-SCNC: 104 MMOL/L (ref 98–120)
CO2: 27 MMOL/L (ref 22–31)
CREAT SERPL-MCNC: 0.9 MG/DL (ref 0.5–1)
GFR CALCULATED: > 60
GLOBULIN: 2.5 G/DL
GLUCOSE: 125 MG/DL (ref 65–105)
MAGNESIUM: 1.8 MG/DL (ref 1.6–2.3)
POTASSIUM SERPL-SCNC: 5.1 MMOL/L (ref 3.6–5)
PTH INTACT: 76.91 PG/ML (ref 15–65)
SODIUM BLD-SCNC: 140 MMOL/L (ref 135–145)
TOTAL PROTEIN, SERUM: 7.1 G/DL (ref 6.3–8.2)
TSH SERPL DL<=0.05 MIU/L-ACNC: 0.53 MIU/ML (ref 0.49–4.67)
VITAMIN D 25-HYDROXY: 40.2 NG/ML (ref 30–100)

## 2022-05-19 ENCOUNTER — TELEPHONE (OUTPATIENT)
Dept: FAMILY MEDICINE CLINIC | Age: 67
End: 2022-05-19

## 2022-05-19 DIAGNOSIS — E11.9 TYPE 2 DIABETES MELLITUS WITHOUT COMPLICATION, WITH LONG-TERM CURRENT USE OF INSULIN (HCC): ICD-10-CM

## 2022-05-19 DIAGNOSIS — E78.00 PURE HYPERCHOLESTEROLEMIA: ICD-10-CM

## 2022-05-19 DIAGNOSIS — Z79.4 TYPE 2 DIABETES MELLITUS WITHOUT COMPLICATION, WITH LONG-TERM CURRENT USE OF INSULIN (HCC): ICD-10-CM

## 2022-05-19 RX ORDER — MAGNESIUM GLUCONATE 27 MG(500)
TABLET ORAL
Qty: 90 TABLET | Refills: 3 | Status: SHIPPED | OUTPATIENT
Start: 2022-05-19

## 2022-05-19 RX ORDER — OMEPRAZOLE 40 MG/1
CAPSULE, DELAYED RELEASE ORAL
Qty: 90 CAPSULE | Refills: 3 | Status: SHIPPED | OUTPATIENT
Start: 2022-05-19

## 2022-05-19 RX ORDER — GLUCOSAMINE HCL/CHONDROITIN SU 500-400 MG
CAPSULE ORAL
Qty: 100 STRIP | Refills: 3 | Status: SHIPPED | OUTPATIENT
Start: 2022-05-19 | End: 2022-05-26 | Stop reason: SDUPTHER

## 2022-05-26 ENCOUNTER — OFFICE VISIT (OUTPATIENT)
Dept: FAMILY MEDICINE CLINIC | Age: 67
End: 2022-05-26
Payer: MEDICARE

## 2022-05-26 VITALS
BODY MASS INDEX: 26.5 KG/M2 | HEART RATE: 71 BPM | WEIGHT: 135 LBS | OXYGEN SATURATION: 97 % | HEIGHT: 60 IN | SYSTOLIC BLOOD PRESSURE: 136 MMHG | DIASTOLIC BLOOD PRESSURE: 70 MMHG

## 2022-05-26 DIAGNOSIS — Z00.00 INITIAL MEDICARE ANNUAL WELLNESS VISIT: Primary | ICD-10-CM

## 2022-05-26 DIAGNOSIS — E11.9 TYPE 2 DIABETES MELLITUS WITHOUT COMPLICATION, WITH LONG-TERM CURRENT USE OF INSULIN (HCC): ICD-10-CM

## 2022-05-26 DIAGNOSIS — Z79.4 TYPE 2 DIABETES MELLITUS WITHOUT COMPLICATION, WITH LONG-TERM CURRENT USE OF INSULIN (HCC): ICD-10-CM

## 2022-05-26 DIAGNOSIS — I10 PRIMARY HYPERTENSION: ICD-10-CM

## 2022-05-26 DIAGNOSIS — E21.3 HYPERPARATHYROIDISM (HCC): ICD-10-CM

## 2022-05-26 DIAGNOSIS — Z51.81 MEDICATION MONITORING ENCOUNTER: ICD-10-CM

## 2022-05-26 DIAGNOSIS — E78.2 MIXED HYPERLIPIDEMIA: ICD-10-CM

## 2022-05-26 DIAGNOSIS — Z11.59 NEED FOR HEPATITIS C SCREENING TEST: ICD-10-CM

## 2022-05-26 PROBLEM — D50.9 IRON DEFICIENCY ANEMIA: Status: RESOLVED | Noted: 2020-10-29 | Resolved: 2022-05-26

## 2022-05-26 LAB — HBA1C MFR BLD: 6.6 %

## 2022-05-26 PROCEDURE — 99397 PER PM REEVAL EST PAT 65+ YR: CPT | Performed by: FAMILY MEDICINE

## 2022-05-26 PROCEDURE — G0402 INITIAL PREVENTIVE EXAM: HCPCS | Performed by: FAMILY MEDICINE

## 2022-05-26 PROCEDURE — 3017F COLORECTAL CA SCREEN DOC REV: CPT | Performed by: FAMILY MEDICINE

## 2022-05-26 PROCEDURE — G0439 PPPS, SUBSEQ VISIT: HCPCS | Performed by: FAMILY MEDICINE

## 2022-05-26 PROCEDURE — 1123F ACP DISCUSS/DSCN MKR DOCD: CPT | Performed by: FAMILY MEDICINE

## 2022-05-26 PROCEDURE — 3044F HG A1C LEVEL LT 7.0%: CPT | Performed by: FAMILY MEDICINE

## 2022-05-26 PROCEDURE — PBSHW POCT GLYCOSYLATED HEMOGLOBIN (HGB A1C): Performed by: FAMILY MEDICINE

## 2022-05-26 PROCEDURE — 1036F TOBACCO NON-USER: CPT | Performed by: FAMILY MEDICINE

## 2022-05-26 PROCEDURE — 83036 HEMOGLOBIN GLYCOSYLATED A1C: CPT | Performed by: FAMILY MEDICINE

## 2022-05-26 PROCEDURE — 2022F DILAT RTA XM EVC RTNOPTHY: CPT | Performed by: FAMILY MEDICINE

## 2022-05-26 PROCEDURE — 99214 OFFICE O/P EST MOD 30 MIN: CPT | Performed by: FAMILY MEDICINE

## 2022-05-26 PROCEDURE — G8417 CALC BMI ABV UP PARAM F/U: HCPCS | Performed by: FAMILY MEDICINE

## 2022-05-26 PROCEDURE — 1090F PRES/ABSN URINE INCON ASSESS: CPT | Performed by: FAMILY MEDICINE

## 2022-05-26 PROCEDURE — G8427 DOCREV CUR MEDS BY ELIG CLIN: HCPCS | Performed by: FAMILY MEDICINE

## 2022-05-26 PROCEDURE — G8399 PT W/DXA RESULTS DOCUMENT: HCPCS | Performed by: FAMILY MEDICINE

## 2022-05-26 RX ORDER — GLUCOSAMINE HCL/CHONDROITIN SU 500-400 MG
CAPSULE ORAL
Qty: 100 STRIP | Refills: 3 | Status: SHIPPED | OUTPATIENT
Start: 2022-05-26

## 2022-05-26 SDOH — HEALTH STABILITY: PHYSICAL HEALTH: ON AVERAGE, HOW MANY DAYS PER WEEK DO YOU ENGAGE IN MODERATE TO STRENUOUS EXERCISE (LIKE A BRISK WALK)?: 2 DAYS

## 2022-05-26 SDOH — HEALTH STABILITY: PHYSICAL HEALTH: ON AVERAGE, HOW MANY MINUTES DO YOU ENGAGE IN EXERCISE AT THIS LEVEL?: 30 MIN

## 2022-05-26 ASSESSMENT — LIFESTYLE VARIABLES
HOW OFTEN DO YOU HAVE A DRINK CONTAINING ALCOHOL: MONTHLY OR LESS
HOW MANY STANDARD DRINKS CONTAINING ALCOHOL DO YOU HAVE ON A TYPICAL DAY: 1 OR 2
HOW OFTEN DO YOU HAVE A DRINK CONTAINING ALCOHOL: 2
HOW OFTEN DO YOU HAVE SIX OR MORE DRINKS ON ONE OCCASION: 1
HOW MANY STANDARD DRINKS CONTAINING ALCOHOL DO YOU HAVE ON A TYPICAL DAY: 1

## 2022-05-26 ASSESSMENT — VISUAL ACUITY
OD_CC: 20/30
OS_CC: 20/40

## 2022-05-26 ASSESSMENT — PATIENT HEALTH QUESTIONNAIRE - PHQ9
2. FEELING DOWN, DEPRESSED OR HOPELESS: 0
SUM OF ALL RESPONSES TO PHQ QUESTIONS 1-9: 0
1. LITTLE INTEREST OR PLEASURE IN DOING THINGS: 0
SUM OF ALL RESPONSES TO PHQ9 QUESTIONS 1 & 2: 0

## 2022-05-26 NOTE — PROGRESS NOTES
Medicare Annual Wellness Visit    Pippa Dowell is here for Medicare AWV (Welcome to medicare) and Diabetes (4mo follow up)    Assessment & Plan   Initial Medicare annual wellness visit  -     Full code  Type 2 diabetes mellitus without complication, with long-term current use of insulin (HCC)  -     POCT glycosylated hemoglobin (Hb A1C)  -     blood glucose monitor strips; Test 1-2  times a day & as needed for symptoms of irregular blood glucose. Dispense sufficient amount for indicated testing frequency plus additional to accommodate PRN testing needs. , Disp-100 strip, R-3, Normal  -     Lipid Panel; Future  -     Comprehensive Metabolic Panel; Future  Hyperparathyroidism ([facility] [address]  Primary hypertension  Mixed hyperlipidemia  Medication monitoring encounter  -     Vitamin B12; Future  Need for hepatitis C screening test  -     Hepatitis C Antibody; Future      Recommendations for Preventive Services Due: see orders and patient instructions/AVS.    Hypertension hyperlipidemia and diabetes are all well controlled at this time. Asymptomatic. Continue with the diet and exercise as well as her current medications. Labs reviewed and at goal.    Hyperparathyroidism is improved and calcium level is stable. To continue to follow-up with endocrinology. Recommended screening schedule for the next 5-10 years is provided to the patient in written form: see Patient Instructions/AVS.     Return in 6 months (on 11/26/2022) for Medicare Annual Wellness Visit in 1 year, HTN, Medication recheck, DM follow up. Subjective   The following acute and/or chronic problems were also addressed today:  Pippa Dowell is a 77 y.o. female who presents for follow-up of hypertension, diabetes, and hyperlipidemia. She indicates that she is feeling well and denies any symptoms referable to her elevated blood pressure or diabetes.    Specifically denies chest pain, palpitations, dyspnea, peripheral edema, thirst, frequent urination, and blurred vision. Current medication regimen is as listed below. She denies any side effects of medication, and has been taking it regularly. Home glucose readings have been 100-115, evening 120-150. Checks blood sugars 1-2 times per day. Last eye exam was about 1 year ago [place]. Diabetic complications includenone. arunas been exercising regularly gardening and walking. Hasbeen following a diabetic diet. Patient's complete Health Risk Assessment and screening values have been reviewed and are found in Flowsheets. The following problems were reviewed today and where indicated follow up appointments were made and/or referrals ordered.      Visual Acuity Screening    Right eye Left eye Both eyes   Without correction:      With correction: 20/30 20/40 20/20       Positive Risk Factor Screenings with Interventions:               General Health and ACP:  General  In general, how would you say your health is?: Very Good  In the past 7 days, have you experienced any of the following: New or Increased Pain, New or Increased Fatigue, Loneliness, Social Isolation, Stress or Anger?: No  Do you get the social and emotional support that you need?: Yes  Do you have a Living Will?: Yes    Advance Directives     Power of  Living Will ACP-Advance Directive ACP-Power of     Not on File Not on File Not on File Not on File      General Health Risk Interventions:  · none indicated       Hearing/Vision:  Do you or your family notice any trouble with your hearing that hasn't been managed with hearing aids?: No  Do you have difficulty driving, watching TV, or doing any of your daily activities because of your eyesight?: No  Have you had an eye exam within the past year?: Yes   Visual Acuity Screening    Right eye Left eye Both eyes   Without correction:      With correction: 20/30 20/40 20/20             Objective   Vitals:    05/26/22 0945   BP: 136/70   Site: Left Upper Arm   Position: Sitting   Cuff Size: Large Adult Pulse: 71   SpO2: 97%   Weight: 135 lb (61.2 kg)   Height: 4' 11.5\" (1.511 m)      Body mass index is 26.81 kg/m². Wt Readings from Last 3 Encounters:   05/26/22 135 lb (61.2 kg)   01/31/22 133 lb (60.3 kg)   10/28/21 145 lb (65.8 kg)          Physical Exam  Vitals and nursing note reviewed. Constitutional:       Appearance: Normal appearance. She is well-developed. HENT:      Head: Normocephalic and atraumatic. Eyes:      Extraocular Movements: Extraocular movements intact. Conjunctiva/sclera: Conjunctivae normal.      Pupils: Pupils are equal, round, and reactive to light. Neck:      Thyroid: No thyromegaly. Vascular: No JVD. Cardiovascular:      Rate and Rhythm: Normal rate and regular rhythm. Pulses: Normal pulses. Heart sounds: Normal heart sounds. No murmur heard. No friction rub. No gallop. Pulmonary:      Effort: Pulmonary effort is normal. No respiratory distress. Breath sounds: Normal breath sounds. Musculoskeletal:         General: No swelling, tenderness or deformity. Cervical back: Normal range of motion and neck supple. Right lower leg: No edema. Left lower leg: No edema. Lymphadenopathy:      Cervical: No cervical adenopathy. Skin:     General: Skin is warm. Neurological:      General: No focal deficit present. Mental Status: She is alert and oriented to person, place, and time. Cranial Nerves: No cranial nerve deficit. Sensory: No sensory deficit. Psychiatric:         Mood and Affect: Mood normal.         Behavior: Behavior normal.         Thought Content: Thought content normal.         Judgment: Judgment normal.              Allergies   Allergen Reactions    Metformin And Related      Diarrhea    Simvastatin      Leg cramps     Prior to Visit Medications    Medication Sig Taking? Authorizing Provider   blood glucose monitor strips Test 1-2  times a day & as needed for symptoms of irregular blood glucose.  Dispense sufficient amount for indicated testing frequency plus additional to accommodate PRN testing needs. Yes Rose Lambert MD   omeprazole (PRILOSEC) 40 MG delayed release capsule TAKE 1 CAPSULE DAILY Yes Rose Lambert MD   magnesium gluconate (MAGONATE) 500 MG tablet TAKE 1 TABLET DAILY Yes Rose Lambert MD   rosuvastatin (CRESTOR) 10 MG tablet Take 1 tablet by mouth nightly Yes Rose Lambert MD   Exenatide ER (BYDUREON BCISE) 2 MG/0.85ML AUIJ Inject 2 mg into the skin once a week Yes Rose Lambert MD   lisinopril (PRINIVIL;ZESTRIL) 10 MG tablet Take 1 tablet by mouth daily Yes Rose Lambert MD   amLODIPine (NORVASC) 2.5 MG tablet Take 1 tablet by mouth daily Yes Rose Lambert MD   meloxicam (MOBIC) 15 MG tablet TAKE 1 TABLET DAILY Yes Rose Lambert MD   aspirin 81 MG tablet Take 81 mg by mouth daily Yes Historical Provider, MD   Multiple Vitamins-Minerals (MULTIVITAMIN & MINERAL PO) Take by mouth Yes Historical Provider, MD   Insulin Pen Needle (B-D ULTRAFINE III SHORT PEN) 31G X 8 MM MISC INJECT UNDER THE SKIN DAILY  Rose Lambert MD   glucose monitoring kit (FREESTYLE) monitoring kit 1 kit by Does not apply route daily  Rose Lambert MD   Lancets MISC 1 each by Does not apply route 2 times daily  Rose Lambert MD       CareTeam (Including outside providers/suppliers regularly involved in providing care):   Patient Care Team:  Rose Lambert MD as PCP - General (Family Medicine)  Rose Lambert MD as PCP - [facility]     Reviewed and updated this visit:  Tobacco  Allergies  Meds  Problems  Med Hx  Surg Hx  Soc Hx  Fam Hx                     Reviewed and addendums made by myself on the day of the visit with the patient.  Rose Lambert MD

## 2022-05-26 NOTE — PATIENT INSTRUCTIONS
Personalized Preventive Plan for Luisito Maurer  5/26/2022  Medicare offers a range of preventive health benefits. Some of the tests and screenings are paid in full while other may be subject to a deductible, co-insurance, and/or copay. Some of these benefits include a comprehensive review of your medical history including lifestyle, illnesses that may run in your family, and various assessments and screenings as appropriate. After reviewing your medical record and screening and assessments performed today your provider may have ordered immunizations, labs, imaging, and/or referrals for you. A list of these orders (if applicable) as well as your Preventive Care list are included within your After Visit Summary for your review. Other Preventive Recommendations:    · A preventive eye exam performed by an eye specialist is recommended every 1-2 years to screen for glaucoma; cataracts, macular degeneration, and other eye disorders. · A preventive dental visit is recommended every 6 months. · Try to get at least 150 minutes of exercise per week or 10,000 steps per day on a pedometer . · Order or download the FREE \"Exercise & Physical Activity: Your Everyday Guide\" from [employer]. Call 1-515.712.7276 or search [employer] online. · You need 1560-4080 mg of calcium and 9434-7383 IU of vitamin D per day. It is possible to meet your calcium requirement with diet alone, but a vitamin D supplement is usually necessary to meet this goal.  · When exposed to the sun, use a sunscreen that protects against both UVA and UVB radiation with an SPF of 30 or greater. Reapply every 2 to 3 hours or after sweating, drying off with a towel, or swimming. · Always wear a seat belt when traveling in a car. Always wear a helmet when riding a bicycle or motorcycle. Personalized Preventive Plan for Luisito Maurer  5/26/2022  Medicare offers a range of preventive health benefits.  Some of the tests and screenings are paid in full while other may be subject to a deductible, co-insurance, and/or copay. Some of these benefits include a comprehensive review of your medical history including lifestyle, illnesses that may run in your family, and various assessments and screenings as appropriate. After reviewing your medical record and screening and assessments performed today your provider may have ordered immunizations, labs, imaging, and/or referrals for you. A list of these orders (if applicable) as well as your Preventive Care list are included within your After Visit Summary for your review. Other Preventive Recommendations:    A preventive eye exam performed by an eye specialist is recommended every 1-2 years to screen for glaucoma; cataracts, macular degeneration, and other eye disorders. A preventive dental visit is recommended every 6 months. Try to get at least 150 minutes of exercise per week or 10,000 steps per day on a pedometer . Order or download the FREE \"Exercise & Physical Activity: Your Everyday Guide\" from [employer]. Call 2-812.761.5814 or search [employer] online. You need 7782-0353 mg of calcium and 8079-7555 IU of vitamin D per day. It is possible to meet your calcium requirement with diet alone, but a vitamin D supplement is usually necessary to meet this goal.  When exposed to the sun, use a sunscreen that protects against both UVA and UVB radiation with an SPF of 30 or greater. Reapply every 2 to 3 hours or after sweating, drying off with a towel, or swimming. Always wear a seat belt when traveling in a car. Always wear a helmet when riding a bicycle or motorcycle.

## 2022-08-21 DIAGNOSIS — I10 PRIMARY HYPERTENSION: ICD-10-CM

## 2022-08-22 DIAGNOSIS — M19.90 ARTHRITIS: ICD-10-CM

## 2022-08-22 RX ORDER — LISINOPRIL 10 MG/1
TABLET ORAL
Qty: 90 TABLET | Refills: 1 | Status: SHIPPED | OUTPATIENT
Start: 2022-08-22

## 2022-08-22 RX ORDER — MELOXICAM 15 MG/1
TABLET ORAL
Qty: 90 TABLET | Refills: 0 | Status: SHIPPED | OUTPATIENT
Start: 2022-08-22

## 2022-08-22 NOTE — TELEPHONE ENCOUNTER
Thresa Means is requesting a refill on the following medication(s):  Requested Prescriptions     Pending Prescriptions Disp Refills    lisinopril (PRINIVIL;ZESTRIL) 10 MG tablet [Pharmacy Med Name: LISINOPRIL TAB 10MG] 90 tablet 1     Sig: TAKE 1 TABLET DAILY       Last Visit Date (If Applicable):  2/41/8855    Next Visit Date:    11/28/2022

## 2022-08-22 NOTE — TELEPHONE ENCOUNTER
Zeyad Griffith is requesting a refill on the following medication(s):  Requested Prescriptions     Pending Prescriptions Disp Refills    meloxicam (MOBIC) 15 MG tablet 90 tablet 3     Sig: Take 1 tablet by mouth daily       Last Visit Date (If Applicable):  3/35/3013    Next Visit Date:    11/28/2022

## 2022-11-16 ENCOUNTER — NURSE ONLY (OUTPATIENT)
Dept: FAMILY MEDICINE CLINIC | Age: 67
End: 2022-11-16
Payer: MEDICARE

## 2022-11-16 VITALS — BODY MASS INDEX: 26.81 KG/M2 | OXYGEN SATURATION: 97 % | HEART RATE: 86 BPM | WEIGHT: 135 LBS

## 2022-11-16 DIAGNOSIS — Z23 NEED FOR INFLUENZA VACCINATION: Primary | ICD-10-CM

## 2022-11-16 LAB
ALBUMIN/GLOBULIN RATIO: 1.6 G/DL
ALBUMIN: 4.8 G/DL (ref 3.5–5)
ALP BLD-CCNC: 79 UNITS/L (ref 38–126)
ALT SERPL-CCNC: 21 UNITS/L (ref 4–35)
ANION GAP SERPL CALCULATED.3IONS-SCNC: 12.1 MMOL/L
AST SERPL-CCNC: 25 UNITS/L (ref 14–36)
BILIRUB SERPL-MCNC: 0.5 MG/DL (ref 0.2–1.3)
BUN BLDV-MCNC: 25 MG/DL (ref 7–17)
CALCIUM SERPL-MCNC: 10.2 MG/DL (ref 8.4–10.2)
CHLORIDE BLD-SCNC: 100 MMOL/L (ref 98–120)
CHOLESTEROL/HDL RATIO: 3.02 RATIO (ref 0–4.5)
CHOLESTEROL: 163 MG/DL (ref 50–200)
CO2: 27 MMOL/L (ref 22–31)
CREAT SERPL-MCNC: 0.9 MG/DL (ref 0.5–1)
GFR CALCULATED: > 60
GLOBULIN: 3 G/DL
GLUCOSE: 167 MG/DL (ref 65–105)
HDLC SERPL-MCNC: 54 MG/DL (ref 36–68)
HEPATITIS C ANTIBODY: NONREACTIVE
LDL CHOLESTEROL CALCULATED: 76.2 MG/DL (ref 0–160)
POTASSIUM SERPL-SCNC: 4.7 MMOL/L (ref 3.6–5)
SODIUM BLD-SCNC: 138 MMOL/L (ref 135–145)
TOTAL PROTEIN, SERUM: 7.9 G/DL (ref 6.3–8.2)
TRIGL SERPL-MCNC: 164 MG/DL (ref 10–250)
VITAMIN B-12: 700 PG/ML (ref 239–931)
VLDLC SERPL CALC-MCNC: 33 MG/DL (ref 0–50)

## 2022-11-16 PROCEDURE — 99211 OFF/OP EST MAY X REQ PHY/QHP: CPT | Performed by: FAMILY MEDICINE

## 2022-11-16 PROCEDURE — PBSHW INFLUENZA, FLUAD, (AGE 65 Y+), IM, PF, 0.5 ML: Performed by: FAMILY MEDICINE

## 2022-11-16 PROCEDURE — PBSHW PBB SHADOW CHARGE: Performed by: FAMILY MEDICINE

## 2022-11-16 PROCEDURE — G0008 ADMIN INFLUENZA VIRUS VAC: HCPCS | Performed by: FAMILY MEDICINE

## 2022-11-16 NOTE — PROGRESS NOTES
Have you had an allergic reaction to the flu (influenza) shot? no  Are you allergic to eggs or any component of the flu vaccine? no  Do you have a history of Guillain-Heyworth Syndrome (GBS), which is paralysis after receiving the flu vaccine? no  Are you feeling well today? yes  Flu vaccine given as ordered. Patient tolerated it well. No questions re: VIS information. After obtaining consent, and per orders of Dr. Sherwin Bliss, injection of FLU VACCINE given in Right deltoid by Moreno Guo LPN. Patient tolerated well. Patient instructed to remain in clinic for 20 minutes afterwards, and to report any adverse reaction immediately.

## 2022-11-23 ENCOUNTER — OFFICE VISIT (OUTPATIENT)
Dept: FAMILY MEDICINE CLINIC | Age: 67
End: 2022-11-23
Payer: MEDICARE

## 2022-11-23 VITALS
OXYGEN SATURATION: 98 % | HEIGHT: 60 IN | BODY MASS INDEX: 28.47 KG/M2 | SYSTOLIC BLOOD PRESSURE: 118 MMHG | HEART RATE: 81 BPM | DIASTOLIC BLOOD PRESSURE: 70 MMHG | WEIGHT: 145 LBS

## 2022-11-23 DIAGNOSIS — I10 PRIMARY HYPERTENSION: ICD-10-CM

## 2022-11-23 DIAGNOSIS — Z79.4 TYPE 2 DIABETES MELLITUS WITHOUT COMPLICATION, WITH LONG-TERM CURRENT USE OF INSULIN (HCC): Primary | ICD-10-CM

## 2022-11-23 DIAGNOSIS — R01.1 NEWLY RECOGNIZED HEART MURMUR: ICD-10-CM

## 2022-11-23 DIAGNOSIS — E11.9 TYPE 2 DIABETES MELLITUS WITHOUT COMPLICATION, WITH LONG-TERM CURRENT USE OF INSULIN (HCC): Primary | ICD-10-CM

## 2022-11-23 DIAGNOSIS — E21.3 HYPERPARATHYROIDISM (HCC): ICD-10-CM

## 2022-11-23 DIAGNOSIS — E78.2 MIXED HYPERLIPIDEMIA: ICD-10-CM

## 2022-11-23 LAB — HBA1C MFR BLD: 8 %

## 2022-11-23 PROCEDURE — 1123F ACP DISCUSS/DSCN MKR DOCD: CPT | Performed by: FAMILY MEDICINE

## 2022-11-23 PROCEDURE — PBSHW POCT GLYCOSYLATED HEMOGLOBIN (HGB A1C): Performed by: FAMILY MEDICINE

## 2022-11-23 PROCEDURE — 3078F DIAST BP <80 MM HG: CPT | Performed by: FAMILY MEDICINE

## 2022-11-23 PROCEDURE — 99214 OFFICE O/P EST MOD 30 MIN: CPT | Performed by: FAMILY MEDICINE

## 2022-11-23 PROCEDURE — 3074F SYST BP LT 130 MM HG: CPT | Performed by: FAMILY MEDICINE

## 2022-11-23 PROCEDURE — G8427 DOCREV CUR MEDS BY ELIG CLIN: HCPCS | Performed by: FAMILY MEDICINE

## 2022-11-23 PROCEDURE — G8484 FLU IMMUNIZE NO ADMIN: HCPCS | Performed by: FAMILY MEDICINE

## 2022-11-23 PROCEDURE — 99213 OFFICE O/P EST LOW 20 MIN: CPT | Performed by: FAMILY MEDICINE

## 2022-11-23 PROCEDURE — 1090F PRES/ABSN URINE INCON ASSESS: CPT | Performed by: FAMILY MEDICINE

## 2022-11-23 PROCEDURE — 83036 HEMOGLOBIN GLYCOSYLATED A1C: CPT | Performed by: FAMILY MEDICINE

## 2022-11-23 PROCEDURE — G8399 PT W/DXA RESULTS DOCUMENT: HCPCS | Performed by: FAMILY MEDICINE

## 2022-11-23 PROCEDURE — 3052F HG A1C>EQUAL 8.0%<EQUAL 9.0%: CPT | Performed by: FAMILY MEDICINE

## 2022-11-23 PROCEDURE — G8417 CALC BMI ABV UP PARAM F/U: HCPCS | Performed by: FAMILY MEDICINE

## 2022-11-23 PROCEDURE — 1036F TOBACCO NON-USER: CPT | Performed by: FAMILY MEDICINE

## 2022-11-23 PROCEDURE — 2022F DILAT RTA XM EVC RTNOPTHY: CPT | Performed by: FAMILY MEDICINE

## 2022-11-23 PROCEDURE — 3017F COLORECTAL CA SCREEN DOC REV: CPT | Performed by: FAMILY MEDICINE

## 2022-11-23 RX ORDER — DAPAGLIFLOZIN 5 MG/1
5 TABLET, FILM COATED ORAL EVERY MORNING
Qty: 30 TABLET | Refills: 3 | Status: SHIPPED | OUTPATIENT
Start: 2022-11-23

## 2022-11-23 SDOH — ECONOMIC STABILITY: FOOD INSECURITY: WITHIN THE PAST 12 MONTHS, THE FOOD YOU BOUGHT JUST DIDN'T LAST AND YOU DIDN'T HAVE MONEY TO GET MORE.: NEVER TRUE

## 2022-11-23 SDOH — ECONOMIC STABILITY: FOOD INSECURITY: WITHIN THE PAST 12 MONTHS, YOU WORRIED THAT YOUR FOOD WOULD RUN OUT BEFORE YOU GOT MONEY TO BUY MORE.: NEVER TRUE

## 2022-11-23 ASSESSMENT — SOCIAL DETERMINANTS OF HEALTH (SDOH): HOW HARD IS IT FOR YOU TO PAY FOR THE VERY BASICS LIKE FOOD, HOUSING, MEDICAL CARE, AND HEATING?: NOT HARD AT ALL

## 2022-11-23 NOTE — PATIENT INSTRUCTIONS
Check with the insurance and see if there is a difference in the cost of the medications between [facility]. Also check on the [employer]dure[employer] and see if Ozempic or trulicity would be better covered.   ([address] [facility] [employer] and me patient assistance program application)

## 2022-11-23 NOTE — PROGRESS NOTES
[facility]  [address]  Dept: [phone]  Dept BTZ:[phone]    Sameer Donato is a 79 y.o. female who presents today for her medical conditions/complaints as notedbelow. Sameer Donato is c/o of Diabetes, Hypertension, and 6 Month Follow-Up        Assessment/Plan:     1. Type 2 diabetes mellitus without complication, with long-term current use of insulin ([facility])  -     POCT Hb A1C (glycosylated hemoglobin)  -      DIABETES FOOT EXAM  2. Hyperparathyroidism ([address]  3. Mixed hyperlipidemia  4. Primary hypertension  5. Newly recognized heart murmur  -     ECHO Complete 2D W Doppler W Color; Future    Increase the activity-- since she has retired not getting as many steps. Would recommend the Farxiga 5 mg daily. Watch for lower blood pressures and with Farxiga amlodipine if she starts to run a little bit low with this addition. Increase to the 10 mg dose in 1 to 2 months of blood sugar still running high. Patient Instructions   Check with the insurance and see if there is a difference in the cost of the medications between [facility]. Also check on the Bydureon and see if Ozempic or trulicity would be better covered. ([id number] [facility] and me patient assistance program application)    Echo to rule out aortic stenosis with the new onset murmur. Hyperlipidemia and hypertension otherwise controlled and asymptomatic.     Lab Results   Component Value Date    WBC 9.6 06/07/2021    HGB 13.4 06/07/2021    HCT 39.3 06/07/2021    .0 06/07/2021    CHOL 163 11/16/2022    TRIG 164 11/16/2022    HDL 54 11/16/2022    ALT 21 11/16/2022    AST 25 11/16/2022     11/16/2022    K 4.7 11/16/2022     11/16/2022    CREATININE 0.9 11/16/2022    BUN 25 (H) 11/16/2022    CO2 27 11/16/2022    TSH 0.53 05/18/2022    GLUF 133 (H) 03/10/2020    LABA1C 8.0 11/23/2022    LABA1C 6.6 05/26/2022    LABA1C 6.1 01/31/2022    LABMICR 14.8 (H) 10/25/2021       Return in about 4 months (around 3/23/2023). Subjective:      HPI:     BETTY Lowe is a 79 y.o. female who presents for follow-up of hypertension, diabetes, and hyperlipidemia. She indicates that she is feeling well and denies any symptoms referable to her elevated blood pressure or diabetes. Specifically denies chest pain, palpitations, dyspnea, peripheral edema, thirst, frequent urination, and blurred vision. Current medication regimen is as listed below. She denies any side effects of medication, and has been taking it regularly. Home glucose readings have been high-- in the 200's at night, mornings are high also-- up to the 150's or higher. Checks blood sugars 1-2 times daily. Last eye exam was  with[facility]. Diabetic complications includenone. shehas not been exercising regularly exercising as much as she was. Hasbeen following a diabetic diet. BP Readings from Last 3 Encounters:   22 118/70   22 136/70   22 134/74          (goal 120/80)    Wt Readings from Last 3 Encounters:   22 145 lb (65.8 kg)   22 135 lb (61.2 kg)   22 135 lb (61.2 kg)        Past Medical History:   Diagnosis Date    Hyperlipidemia     Hypertension     Nephrolithiasis     Type 2 diabetes mellitus without complication [facility])       Past Surgical History:   Procedure Laterality Date    CHOLECYSTECTOMY      COLONOSCOPY  2018    Dr Jake Erickson- negative/normal        History reviewed. No pertinent family history.     Social History     Tobacco Use    Smoking status: Former     Packs/day: 0.50     Years: 4.00     Pack years: 2.00     Types: Cigarettes     Quit date: 1997     Years since quittin.5    Smokeless tobacco: Never   Substance Use Topics    Alcohol use: Yes      Current Outpatient Medications   Medication Sig Dispense Refill    FARXIGA 5 MG tablet Take 1 tablet by mouth every morning 30 tablet 3    lisinopril (PRINIVIL;ZESTRIL) 10 MG tablet TAKE 1 TABLET DAILY 90 tablet 1    meloxicam (MOBIC) 15 MG tablet Take 1 tablet by mouth daily 90 tablet 0    blood glucose monitor strips Test 1-2  times a day & as needed for symptoms of irregular blood glucose. Dispense sufficient amount for indicated testing frequency plus additional to accommodate PRN testing needs. 100 strip 3    omeprazole (PRILOSEC) 40 MG delayed release capsule TAKE 1 CAPSULE DAILY 90 capsule 3    magnesium gluconate (MAGONATE) 500 MG tablet TAKE 1 TABLET DAILY 90 tablet 3    rosuvastatin (CRESTOR) 10 MG tablet Take 1 tablet by mouth nightly 90 tablet 3    Exenatide ER (BYDUREON BCISE) 2 MG/0.85ML AUIJ Inject 2 mg into the skin once a week 12 pen 3    amLODIPine (NORVASC) 2.5 MG tablet Take 1 tablet by mouth daily 90 tablet 3    Insulin Pen Needle (B-D ULTRAFINE III SHORT PEN) 31G X 8 MM MISC INJECT UNDER THE SKIN DAILY 90 each 3    glucose monitoring kit (FREESTYLE) monitoring kit 1 kit by Does not apply route daily 1 kit 0    Lancets MISC 1 each by Does not apply route 2 times daily 100 each 5    aspirin 81 MG tablet Take 81 mg by mouth daily      Multiple Vitamins-Minerals (MULTIVITAMIN & MINERAL PO) Take by mouth       No current facility-administered medications for this visit.      Allergies   Allergen Reactions    Metformin And Related      Diarrhea    Simvastatin      Leg cramps       Health Maintenance   Topic Date Due    Shingles vaccine (1 of 2) Never done    COVID-19 Vaccine (4 - Booster for Moderna series) 12/23/2021    Diabetic microalbuminuria test  10/25/2022    Depression Screen  05/26/2023    Annual Wellness Visit (AWV)  05/27/2023    Diabetic retinal exam  08/25/2023    Lipids  11/16/2023    Diabetic foot exam  11/23/2023    A1C test (Diabetic or Prediabetic)  11/23/2023    Breast cancer screen  12/02/2023    Colorectal Cancer Screen  01/08/2028    DTaP/Tdap/Td vaccine (2 - Td or Tdap) 10/29/2030    DEXA (modify frequency per FRAX score)  Completed    Flu vaccine  Completed Pneumococcal 65+ years Vaccine  Completed    Hepatitis C screen  Completed    Hepatitis A vaccine  Aged Out    Hib vaccine  Aged Out    Meningococcal (ACWY) vaccine  Aged Out         Review of Systems    Objective:     /70 (Site: Right Upper Arm, Position: Sitting, Cuff Size: Small Adult)   Pulse 81   Ht 4' 11.5\" (1.511 m)   Wt 145 lb (65.8 kg)   SpO2 98%   BMI 28.80 kg/m²     Physical Exam  Vitals and nursing note reviewed. Constitutional:       Appearance: Normal appearance. She is well-developed. HENT:      Head: Normocephalic and atraumatic. Eyes:      Extraocular Movements: Extraocular movements intact. Conjunctiva/sclera: Conjunctivae normal.      Pupils: Pupils are equal, round, and reactive to light. Neck:      Thyroid: No thyromegaly. Vascular: No JVD. Cardiovascular:      Rate and Rhythm: Normal rate and regular rhythm. Pulses: Normal pulses. Heart sounds: Murmur (soft at the RSB) heard. No friction rub. No gallop. Pulmonary:      Effort: Pulmonary effort is normal. No respiratory distress. Breath sounds: Normal breath sounds. Musculoskeletal:         General: No swelling, tenderness or deformity. Cervical back: Normal range of motion and neck supple. Right lower leg: No edema. Left lower leg: No edema. Lymphadenopathy:      Cervical: No cervical adenopathy. Skin:     General: Skin is warm. Neurological:      General: No focal deficit present. Mental Status: She is alert and oriented to person, place, and time. Cranial Nerves: No cranial nerve deficit. Sensory: No sensory deficit. Psychiatric:         Mood and Affect: Mood normal.         Behavior: Behavior normal.         Thought Content: Thought content normal.         Judgment: Judgment normal.             Multiple labs and other testing may have been ordered which may not be completely evident from the above note due to system interface incompatibilities. Patient given educational materials - see patientinstructions. Discussed use, benefit, and side effects of prescribed medications. All patient questions answered. Pt voiced understanding. Reviewed health maintenance. Instructed to continue current medications, diet andexercise. Patient agreed with treatment plan. Follow up as directed.      (Please note that portions of this note were completed with a voice-recognition program. Efforts were made to edit the dictation but occasionally words are mis-transcribed.)    Electronically signed by William Thayer MD on 11/23/2022

## 2022-12-05 DIAGNOSIS — R01.1 NEWLY RECOGNIZED HEART MURMUR: ICD-10-CM

## 2022-12-05 DIAGNOSIS — M19.90 ARTHRITIS: ICD-10-CM

## 2022-12-05 NOTE — TELEPHONE ENCOUNTER
Dakota Anderson is requesting a refill on the following medication(s):  Requested Prescriptions     Pending Prescriptions Disp Refills    meloxicam (MOBIC) 15 MG tablet 90 tablet 0     Sig: Take 1 tablet by mouth daily       Last Visit Date (If Applicable):  90/42/2553    Next Visit Date:    3/22/2023

## 2022-12-06 RX ORDER — MELOXICAM 15 MG/1
TABLET ORAL
Qty: 90 TABLET | Refills: 0 | Status: SHIPPED | OUTPATIENT
Start: 2022-12-06

## 2022-12-28 ENCOUNTER — TELEPHONE (OUTPATIENT)
Dept: FAMILY MEDICINE CLINIC | Age: 67
End: 2022-12-28

## 2022-12-28 NOTE — TELEPHONE ENCOUNTER
Patient called in regards to her [id number] & Me assistance. States she has not received her [employer] and when looks at site it states they are still awaiting a prescription. Advised pt that pcp was out all week along with her nurses but would forward message to have Nurse Catalino Bennett look into this.

## 2023-01-03 NOTE — TELEPHONE ENCOUNTER
 
 Pt called, pt has received [employer].

## 2023-01-20 DIAGNOSIS — I10 PRIMARY HYPERTENSION: ICD-10-CM

## 2023-01-23 RX ORDER — AMLODIPINE BESYLATE 2.5 MG/1
TABLET ORAL
Qty: 90 TABLET | Refills: 3 | Status: SHIPPED | OUTPATIENT
Start: 2023-01-23

## 2023-01-23 NOTE — TELEPHONE ENCOUNTER
Mary Padilla is requesting a refill on the following medication(s):  Requested Prescriptions     Pending Prescriptions Disp Refills    amLODIPine (NORVASC) 2.5 MG tablet [Pharmacy Med Name: AMLODIPINE TAB 2.5MG] 90 tablet 3     Sig: TAKE 1 TABLET DAILY       Last Visit Date (If Applicable):  43/83/6691    Next Visit Date:    3/22/2023

## 2023-01-27 DIAGNOSIS — I10 PRIMARY HYPERTENSION: ICD-10-CM

## 2023-01-30 RX ORDER — LISINOPRIL 10 MG/1
TABLET ORAL
Qty: 90 TABLET | Refills: 1 | Status: SHIPPED | OUTPATIENT
Start: 2023-01-30

## 2023-01-30 NOTE — TELEPHONE ENCOUNTER
Wilfredo Funk is requesting a refill on the following medication(s):  Requested Prescriptions     Pending Prescriptions Disp Refills    lisinopril (PRINIVIL;ZESTRIL) 10 MG tablet [Pharmacy Med Name: LISINOPRIL TAB 10MG] 90 tablet 1     Sig: TAKE 1 TABLET DAILY       Last Visit Date (If Applicable):  44/02/3608    Next Visit Date:    3/22/2023

## 2023-02-23 DIAGNOSIS — E78.00 PURE HYPERCHOLESTEROLEMIA: ICD-10-CM

## 2023-02-24 RX ORDER — ROSUVASTATIN CALCIUM 10 MG/1
TABLET, COATED ORAL
Qty: 90 TABLET | Refills: 3 | Status: SHIPPED | OUTPATIENT
Start: 2023-02-24

## 2023-02-24 NOTE — TELEPHONE ENCOUNTER
Jackie Dose is requesting a refill on the following medication(s):  Requested Prescriptions     Pending Prescriptions Disp Refills    rosuvastatin (CRESTOR) 10 MG tablet [Pharmacy Med Name: ROSUVASTATIN TAB 10MG] 90 tablet 3     Sig: TAKE 1 TABLET NIGHTLY       Last Visit Date (If Applicable):  73/15/7556    Next Visit Date:    3/22/2023

## 2023-03-22 ENCOUNTER — OFFICE VISIT (OUTPATIENT)
Dept: FAMILY MEDICINE CLINIC | Age: 68
End: 2023-03-22
Payer: MEDICARE

## 2023-03-22 VITALS
HEART RATE: 84 BPM | WEIGHT: 145 LBS | BODY MASS INDEX: 28.8 KG/M2 | SYSTOLIC BLOOD PRESSURE: 130 MMHG | DIASTOLIC BLOOD PRESSURE: 62 MMHG | OXYGEN SATURATION: 99 %

## 2023-03-22 DIAGNOSIS — E78.2 MIXED HYPERLIPIDEMIA: ICD-10-CM

## 2023-03-22 DIAGNOSIS — E21.3 HYPERPARATHYROIDISM (HCC): ICD-10-CM

## 2023-03-22 DIAGNOSIS — Z79.4 TYPE 2 DIABETES MELLITUS WITHOUT COMPLICATION, WITH LONG-TERM CURRENT USE OF INSULIN (HCC): Primary | ICD-10-CM

## 2023-03-22 DIAGNOSIS — E11.9 TYPE 2 DIABETES MELLITUS WITHOUT COMPLICATION, WITH LONG-TERM CURRENT USE OF INSULIN (HCC): Primary | ICD-10-CM

## 2023-03-22 DIAGNOSIS — I10 PRIMARY HYPERTENSION: ICD-10-CM

## 2023-03-22 LAB — HBA1C MFR BLD: 8.1 %

## 2023-03-22 PROCEDURE — 83036 HEMOGLOBIN GLYCOSYLATED A1C: CPT | Performed by: FAMILY MEDICINE

## 2023-03-22 PROCEDURE — 2022F DILAT RTA XM EVC RTNOPTHY: CPT | Performed by: FAMILY MEDICINE

## 2023-03-22 PROCEDURE — 1090F PRES/ABSN URINE INCON ASSESS: CPT | Performed by: FAMILY MEDICINE

## 2023-03-22 PROCEDURE — PBSHW POCT GLYCOSYLATED HEMOGLOBIN (HGB A1C): Performed by: FAMILY MEDICINE

## 2023-03-22 PROCEDURE — G8417 CALC BMI ABV UP PARAM F/U: HCPCS | Performed by: FAMILY MEDICINE

## 2023-03-22 PROCEDURE — 3052F HG A1C>EQUAL 8.0%<EQUAL 9.0%: CPT | Performed by: FAMILY MEDICINE

## 2023-03-22 PROCEDURE — G8427 DOCREV CUR MEDS BY ELIG CLIN: HCPCS | Performed by: FAMILY MEDICINE

## 2023-03-22 PROCEDURE — 3017F COLORECTAL CA SCREEN DOC REV: CPT | Performed by: FAMILY MEDICINE

## 2023-03-22 PROCEDURE — G8399 PT W/DXA RESULTS DOCUMENT: HCPCS | Performed by: FAMILY MEDICINE

## 2023-03-22 PROCEDURE — 1036F TOBACCO NON-USER: CPT | Performed by: FAMILY MEDICINE

## 2023-03-22 PROCEDURE — G8484 FLU IMMUNIZE NO ADMIN: HCPCS | Performed by: FAMILY MEDICINE

## 2023-03-22 PROCEDURE — 99214 OFFICE O/P EST MOD 30 MIN: CPT | Performed by: FAMILY MEDICINE

## 2023-03-22 PROCEDURE — 1123F ACP DISCUSS/DSCN MKR DOCD: CPT | Performed by: FAMILY MEDICINE

## 2023-03-22 PROCEDURE — 99213 OFFICE O/P EST LOW 20 MIN: CPT | Performed by: FAMILY MEDICINE

## 2023-03-22 PROCEDURE — 3075F SYST BP GE 130 - 139MM HG: CPT | Performed by: FAMILY MEDICINE

## 2023-03-22 PROCEDURE — 3078F DIAST BP <80 MM HG: CPT | Performed by: FAMILY MEDICINE

## 2023-03-22 RX ORDER — DAPAGLIFLOZIN 10 MG/1
10 TABLET, FILM COATED ORAL EVERY MORNING
Qty: 90 TABLET | Refills: 3 | Status: SHIPPED | OUTPATIENT
Start: 2023-03-22

## 2023-03-22 SDOH — ECONOMIC STABILITY: INCOME INSECURITY: HOW HARD IS IT FOR YOU TO PAY FOR THE VERY BASICS LIKE FOOD, HOUSING, MEDICAL CARE, AND HEATING?: NOT HARD AT ALL

## 2023-03-22 SDOH — ECONOMIC STABILITY: FOOD INSECURITY: WITHIN THE PAST 12 MONTHS, YOU WORRIED THAT YOUR FOOD WOULD RUN OUT BEFORE YOU GOT MONEY TO BUY MORE.: NEVER TRUE

## 2023-03-22 SDOH — ECONOMIC STABILITY: HOUSING INSECURITY
IN THE LAST 12 MONTHS, WAS THERE A TIME WHEN YOU DID NOT HAVE A STEADY PLACE TO SLEEP OR SLEPT IN A SHELTER (INCLUDING NOW)?: NO

## 2023-03-22 SDOH — ECONOMIC STABILITY: FOOD INSECURITY: WITHIN THE PAST 12 MONTHS, THE FOOD YOU BOUGHT JUST DIDN'T LAST AND YOU DIDN'T HAVE MONEY TO GET MORE.: NEVER TRUE

## 2023-03-22 ASSESSMENT — PATIENT HEALTH QUESTIONNAIRE - PHQ9
SUM OF ALL RESPONSES TO PHQ QUESTIONS 1-9: 0
SUM OF ALL RESPONSES TO PHQ9 QUESTIONS 1 & 2: 0
SUM OF ALL RESPONSES TO PHQ QUESTIONS 1-9: 0
1. LITTLE INTEREST OR PLEASURE IN DOING THINGS: 0
2. FEELING DOWN, DEPRESSED OR HOPELESS: 0
SUM OF ALL RESPONSES TO PHQ QUESTIONS 1-9: 0
SUM OF ALL RESPONSES TO PHQ QUESTIONS 1-9: 0

## 2023-03-22 NOTE — PROGRESS NOTES
hyperlipidemia. She indicates that she is feeling well and denies any symptoms referable to her elevated blood pressure or diabetes. Specifically denies chest pain, palpitations, dyspnea, peripheral edema, thirst, frequent urination, and blurred vision. Current medication regimen is as listed below. She denies any side effects of medication, and has been taking it regularly. Home glucose readings have been running higher than usual. Checks blood sugars once daily 150's. Last eye exam was   Diabetic complications includenone. shehas been exercising regularly starting to walk again more. Hasbeen following a diabeticdiet. Has been on the farxiga, is thirsty all time. BP Readings from Last 3 Encounters:   23 130/62   22 118/70   22 136/70          (goal 120/80)    Wt Readings from Last 3 Encounters:   23 145 lb (65.8 kg)   22 145 lb (65.8 kg)   22 135 lb (61.2 kg)        Past Medical History:   Diagnosis Date    Hyperlipidemia     Hypertension     Nephrolithiasis     Type 2 diabetes mellitus without complication [facility])       Past Surgical History:   Procedure Laterality Date    CHOLECYSTECTOMY      COLONOSCOPY  2018    Dr Albert Mayer- negative/normal        History reviewed. No pertinent family history.     Social History     Tobacco Use    Smoking status: Former     Packs/day: 0.50     Years: 4.00     Pack years: 2.00     Types: Cigarettes     Quit date: 1997     Years since quittin.8    Smokeless tobacco: Never   Substance Use Topics    Alcohol use: Yes      Current Outpatient Medications   Medication Sig Dispense Refill    FARXIGA 10 MG tablet Take 1 tablet by mouth every morning 90 tablet 3    rosuvastatin (CRESTOR) 10 MG tablet TAKE 1 TABLET NIGHTLY 90 tablet 3    lisinopril (PRINIVIL;ZESTRIL) 10 MG tablet TAKE 1 TABLET DAILY 90 tablet 1    amLODIPine (NORVASC) 2.5 MG tablet TAKE 1 TABLET DAILY 90 tablet 3    omeprazole (PRILOSEC) 40 MG delayed release

## 2023-04-18 ENCOUNTER — TELEPHONE (OUTPATIENT)
Dept: FAMILY MEDICINE CLINIC | Age: 68
End: 2023-04-18

## 2023-04-18 NOTE — TELEPHONE ENCOUNTER
Pt called about farxiga. Pt has not received the 10 mg dose. Has 2 weeks left of the 5 mg bid. Pt gets script through [address] [place] [address]. I see where script was sent on 3/22 but pt has not heard anything on the shipment.

## 2023-04-18 NOTE — TELEPHONE ENCOUNTER
Spoke with Rep at [facility] - they did receive the new prescription and have submitted the request. Paula Weinberg takes 10-14 business days. Patient notified by phone.

## 2023-04-25 DIAGNOSIS — E78.00 PURE HYPERCHOLESTEROLEMIA: ICD-10-CM

## 2023-04-26 RX ORDER — OMEPRAZOLE 40 MG/1
CAPSULE, DELAYED RELEASE ORAL
Qty: 90 CAPSULE | Refills: 3 | Status: SHIPPED | OUTPATIENT
Start: 2023-04-26

## 2023-04-26 NOTE — TELEPHONE ENCOUNTER
[employer] is requesting a refill on the following medication(s):  Requested Prescriptions     Pending Prescriptions Disp Refills    omeprazole (PRILOSEC) 40 MG delayed release capsule 90 capsule 3     Sig: TAKE 1 CAPSULE DAILY       Last Visit Date (If Applicable):  2/10/5045    Next Visit Date:    5/31/2023

## 2023-05-29 SDOH — ECONOMIC STABILITY: INCOME INSECURITY: HOW HARD IS IT FOR YOU TO PAY FOR THE VERY BASICS LIKE FOOD, HOUSING, MEDICAL CARE, AND HEATING?: NOT VERY HARD

## 2023-05-29 SDOH — ECONOMIC STABILITY: FOOD INSECURITY: WITHIN THE PAST 12 MONTHS, YOU WORRIED THAT YOUR FOOD WOULD RUN OUT BEFORE YOU GOT MONEY TO BUY MORE.: NEVER TRUE

## 2023-05-29 SDOH — HEALTH STABILITY: PHYSICAL HEALTH: ON AVERAGE, HOW MANY MINUTES DO YOU ENGAGE IN EXERCISE AT THIS LEVEL?: 60 MIN

## 2023-05-29 SDOH — HEALTH STABILITY: PHYSICAL HEALTH: ON AVERAGE, HOW MANY DAYS PER WEEK DO YOU ENGAGE IN MODERATE TO STRENUOUS EXERCISE (LIKE A BRISK WALK)?: 6 DAYS

## 2023-05-29 SDOH — ECONOMIC STABILITY: FOOD INSECURITY: WITHIN THE PAST 12 MONTHS, THE FOOD YOU BOUGHT JUST DIDN'T LAST AND YOU DIDN'T HAVE MONEY TO GET MORE.: NEVER TRUE

## 2023-05-29 ASSESSMENT — PATIENT HEALTH QUESTIONNAIRE - PHQ9
SUM OF ALL RESPONSES TO PHQ QUESTIONS 1-9: 0
SUM OF ALL RESPONSES TO PHQ9 QUESTIONS 1 & 2: 0
SUM OF ALL RESPONSES TO PHQ QUESTIONS 1-9: 0
1. LITTLE INTEREST OR PLEASURE IN DOING THINGS: 0
2. FEELING DOWN, DEPRESSED OR HOPELESS: 0

## 2023-05-29 ASSESSMENT — LIFESTYLE VARIABLES
HOW OFTEN DO YOU HAVE A DRINK CONTAINING ALCOHOL: 2
HOW MANY STANDARD DRINKS CONTAINING ALCOHOL DO YOU HAVE ON A TYPICAL DAY: 1 OR 2
HOW MANY STANDARD DRINKS CONTAINING ALCOHOL DO YOU HAVE ON A TYPICAL DAY: 1
HOW OFTEN DO YOU HAVE SIX OR MORE DRINKS ON ONE OCCASION: 1
HOW OFTEN DO YOU HAVE A DRINK CONTAINING ALCOHOL: MONTHLY OR LESS

## 2023-05-31 ENCOUNTER — OFFICE VISIT (OUTPATIENT)
Dept: FAMILY MEDICINE CLINIC | Age: 68
End: 2023-05-31
Payer: MEDICARE

## 2023-05-31 VITALS
SYSTOLIC BLOOD PRESSURE: 136 MMHG | HEIGHT: 60 IN | DIASTOLIC BLOOD PRESSURE: 72 MMHG | BODY MASS INDEX: 26.5 KG/M2 | WEIGHT: 135 LBS | HEART RATE: 79 BPM | OXYGEN SATURATION: 96 %

## 2023-05-31 DIAGNOSIS — E11.9 TYPE 2 DIABETES MELLITUS WITHOUT COMPLICATION, WITH LONG-TERM CURRENT USE OF INSULIN (HCC): ICD-10-CM

## 2023-05-31 DIAGNOSIS — E21.3 HYPERPARATHYROIDISM (HCC): ICD-10-CM

## 2023-05-31 DIAGNOSIS — Z79.4 TYPE 2 DIABETES MELLITUS WITHOUT COMPLICATION, WITH LONG-TERM CURRENT USE OF INSULIN (HCC): ICD-10-CM

## 2023-05-31 DIAGNOSIS — Z51.81 MEDICATION MONITORING ENCOUNTER: ICD-10-CM

## 2023-05-31 DIAGNOSIS — E78.2 MIXED HYPERLIPIDEMIA: ICD-10-CM

## 2023-05-31 DIAGNOSIS — I10 PRIMARY HYPERTENSION: ICD-10-CM

## 2023-05-31 DIAGNOSIS — Z00.00 INITIAL MEDICARE ANNUAL WELLNESS VISIT: Primary | ICD-10-CM

## 2023-05-31 PROCEDURE — 2022F DILAT RTA XM EVC RTNOPTHY: CPT | Performed by: FAMILY MEDICINE

## 2023-05-31 PROCEDURE — 3017F COLORECTAL CA SCREEN DOC REV: CPT | Performed by: FAMILY MEDICINE

## 2023-05-31 PROCEDURE — G8399 PT W/DXA RESULTS DOCUMENT: HCPCS | Performed by: FAMILY MEDICINE

## 2023-05-31 PROCEDURE — 3078F DIAST BP <80 MM HG: CPT | Performed by: FAMILY MEDICINE

## 2023-05-31 PROCEDURE — G8427 DOCREV CUR MEDS BY ELIG CLIN: HCPCS | Performed by: FAMILY MEDICINE

## 2023-05-31 PROCEDURE — 3052F HG A1C>EQUAL 8.0%<EQUAL 9.0%: CPT | Performed by: FAMILY MEDICINE

## 2023-05-31 PROCEDURE — 3075F SYST BP GE 130 - 139MM HG: CPT | Performed by: FAMILY MEDICINE

## 2023-05-31 PROCEDURE — G0438 PPPS, INITIAL VISIT: HCPCS | Performed by: FAMILY MEDICINE

## 2023-05-31 PROCEDURE — 99397 PER PM REEVAL EST PAT 65+ YR: CPT | Performed by: FAMILY MEDICINE

## 2023-05-31 PROCEDURE — G8417 CALC BMI ABV UP PARAM F/U: HCPCS | Performed by: FAMILY MEDICINE

## 2023-05-31 PROCEDURE — 99214 OFFICE O/P EST MOD 30 MIN: CPT | Performed by: FAMILY MEDICINE

## 2023-05-31 PROCEDURE — 1036F TOBACCO NON-USER: CPT | Performed by: FAMILY MEDICINE

## 2023-05-31 PROCEDURE — 1090F PRES/ABSN URINE INCON ASSESS: CPT | Performed by: FAMILY MEDICINE

## 2023-05-31 PROCEDURE — 1123F ACP DISCUSS/DSCN MKR DOCD: CPT | Performed by: FAMILY MEDICINE

## 2023-05-31 RX ORDER — EXENATIDE 2 MG/.85ML
2 INJECTION, SUSPENSION, EXTENDED RELEASE SUBCUTANEOUS WEEKLY
Qty: 12 ADJUSTABLE DOSE PRE-FILLED PEN SYRINGE | Refills: 3 | Status: SHIPPED | OUTPATIENT
Start: 2023-05-31

## 2023-05-31 NOTE — PROGRESS NOTES
Medicare Annual Wellness Visit    Zay Johns is here for Medicare AWV and Dizziness (Has noticed more recently that when she stands up from sitting she gets dizzy)    Assessment & Plan   Initial Medicare annual wellness visit  Type 2 diabetes mellitus without complication, with long-term current use of insulin (HCC)  -     Exenatide ER (BYDUREON BCISE) 2 MG/0.85ML injection; Inject 0.85 mLs into the skin once a week, Disp-12 Adjustable Dose Pre-filled Pen Syringe, R-3Print  -     Comprehensive Metabolic Panel; Future  -     Lipid Panel; Future  -     Microalbumin, Ur; Future  -     POCT glycosylated hemoglobin (Hb A1C); Future  Medication monitoring encounter  -     Magnesium; Future  -     Vitamin B12; Future  Primary hypertension  Mixed hyperlipidemia  -     Comprehensive Metabolic Panel; Future  -     Lipid Panel; Future  Hyperparathyroidism ([facility] [address]  -     PTH, Intact with Ionized Calcium; Future    Recommendations for Preventive Services Due: see orders and patient instructions/AVS.    Diabetes is asymptomatic and I suspect well controlled at this time. Continue with her excellent exercise and dietary changes. We will recheck the hemoglobin A1c in about 1 month when she has a 3-month interval.  She will stop it as an outpatient for this test.  Continue at this point with her Bydureon which she is tolerating well and the [place]. 10 mg daily    Hypertension is asymptomatic and well controlled as is her hyperlipidemia. Continue with her rosuvastatin and her lisinopril as well as the very low-dose of the amlodipine    Labs ordered for follow-up of the hyperparathyroidism as well as to make sure we are good with her hyperlipidemia    Recommended screening schedule for the next 5-10 years is provided to the patient in written form: see Patient Instructions/AVS.     Return in about 6 months (around 11/30/2023) for DM follow up.      Subjective   The following acute and/or chronic problems were also addressed

## 2023-05-31 NOTE — PATIENT INSTRUCTIONS
Personalized Preventive Plan for Zay Johns - 5/31/2023  Medicare offers a range of preventive health benefits. Some of the tests and screenings are paid in full while other may be subject to a deductible, co-insurance, and/or copay. Some of these benefits include a comprehensive review of your medical history including lifestyle, illnesses that may run in your family, and various assessments and screenings as appropriate. After reviewing your medical record and screening and assessments performed today your provider may have ordered immunizations, labs, imaging, and/or referrals for you. A list of these orders (if applicable) as well as your Preventive Care list are included within your After Visit Summary for your review. Other Preventive Recommendations:    A preventive eye exam performed by an eye specialist is recommended every 1-2 years to screen for glaucoma; cataracts, macular degeneration, and other eye disorders. A preventive dental visit is recommended every 6 months. Try to get at least 150 minutes of exercise per week or 10,000 steps per day on a pedometer . Order or download the FREE \"Exercise & Physical Activity: Your Everyday Guide\" from [employer]. Call 2-601.989.7048 or search [employer] online. You need 7762-5917 mg of calcium and 9141-5642 IU of vitamin D per day. It is possible to meet your calcium requirement with diet alone, but a vitamin D supplement is usually necessary to meet this goal.  When exposed to the sun, use a sunscreen that protects against both UVA and UVB radiation with an SPF of 30 or greater. Reapply every 2 to 3 hours or after sweating, drying off with a towel, or swimming. Always wear a seat belt when traveling in a car. Always wear a helmet when riding a bicycle or motorcycle.        A Healthy Heart: Care Instructions  Your Care Instructions     Coronary artery disease, also called heart disease, occurs when a

## 2023-06-29 ENCOUNTER — NURSE ONLY (OUTPATIENT)
Dept: FAMILY MEDICINE CLINIC | Age: 68
End: 2023-06-29
Payer: MEDICARE

## 2023-06-29 DIAGNOSIS — E11.9 TYPE 2 DIABETES MELLITUS WITHOUT COMPLICATION, WITH LONG-TERM CURRENT USE OF INSULIN (HCC): ICD-10-CM

## 2023-06-29 DIAGNOSIS — Z79.4 TYPE 2 DIABETES MELLITUS WITHOUT COMPLICATION, WITH LONG-TERM CURRENT USE OF INSULIN (HCC): ICD-10-CM

## 2023-06-29 LAB — HBA1C MFR BLD: 7.2 %

## 2023-06-29 PROCEDURE — PBSHW POCT GLYCOSYLATED HEMOGLOBIN (HGB A1C): Performed by: FAMILY MEDICINE

## 2023-06-29 PROCEDURE — 83037 HB GLYCOSYLATED A1C HOME DEV: CPT | Performed by: FAMILY MEDICINE

## 2023-08-23 DIAGNOSIS — I10 PRIMARY HYPERTENSION: ICD-10-CM

## 2023-08-23 RX ORDER — LISINOPRIL 10 MG/1
10 TABLET ORAL DAILY
Qty: 90 TABLET | Refills: 1 | Status: SHIPPED | OUTPATIENT
Start: 2023-08-23

## 2023-08-23 NOTE — TELEPHONE ENCOUNTER
[facility] is requesting a refill on the following medication(s):  Requested Prescriptions     Pending Prescriptions Disp Refills    lisinopril (PRINIVIL;ZESTRIL) 10 MG tablet 90 tablet 1     Sig: Take 1 tablet by mouth daily       Last Visit Date (If Applicable):  1/23/4861    Next Visit Date:    11/30/2023

## 2023-11-20 LAB
ALBUMIN/GLOBULIN RATIO: 1.5 G/DL
ALBUMIN: 4.6 G/DL (ref 3.5–5)
ALP BLD-CCNC: 59 UNITS/L (ref 38–126)
ALT SERPL-CCNC: 22 UNITS/L (ref 4–35)
ANION GAP SERPL CALCULATED.3IONS-SCNC: 10.1 MMOL/L (ref 12–16)
AST SERPL-CCNC: 37 UNITS/L (ref 14–36)
BILIRUB SERPL-MCNC: 0.6 MG/DL (ref 0.2–1.3)
BUN BLDV-MCNC: 25 MG/DL (ref 7–17)
CALCIUM SERPL-MCNC: 10.5 MG/DL (ref 8.4–10.2)
CHLORIDE BLD-SCNC: 100 MMOL/L (ref 98–120)
CHOLESTEROL/HDL RATIO: 3 RATIO (ref 0–4.5)
CHOLESTEROL: 184 MG/DL (ref 50–200)
CO2: 27 MMOL/L (ref 22–31)
CREAT SERPL-MCNC: 0.9 MG/DL (ref 0.5–1)
GFR CALCULATED: > 60
GLOBULIN: 3 G/DL
GLUCOSE: 101 MG/DL (ref 65–105)
HDLC SERPL-MCNC: 62 MG/DL (ref 36–68)
LDL CHOLESTEROL CALCULATED: 102 MG/DL (ref 0–160)
MAGNESIUM: 2 MG/DL (ref 1.6–2.3)
POTASSIUM SERPL-SCNC: 4.7 MMOL/L (ref 3.6–5)
SODIUM BLD-SCNC: 137 MMOL/L (ref 135–145)
TOTAL PROTEIN, SERUM: 7.6 G/DL (ref 6.3–8.2)
TRIGL SERPL-MCNC: 100 MG/DL (ref 10–250)
VITAMIN B-12: 790 PG/ML (ref 239–931)
VLDLC SERPL CALC-MCNC: 20 MG/DL (ref 0–50)

## 2023-11-21 LAB
CALCIUM IONIZED: 1.4 MMOL/L (ref 1.13–1.33)
CREATININE, RANDOM URINE: 70.1 MG/DL (ref 20–370)
MICROALBUMIN UR-MCNC: 0.8 MG/DL (ref 0–1.7)
MICROALBUMIN/CREAT UR-RTO: 11.41
PARATHYROID HORMONE INTACT: 50 PG/ML (ref 14–72)

## 2023-11-30 ENCOUNTER — TELEPHONE (OUTPATIENT)
Dept: FAMILY MEDICINE CLINIC | Age: 68
End: 2023-11-30

## 2023-11-30 ENCOUNTER — OFFICE VISIT (OUTPATIENT)
Dept: FAMILY MEDICINE CLINIC | Age: 68
End: 2023-11-30
Payer: MEDICARE

## 2023-11-30 VITALS
DIASTOLIC BLOOD PRESSURE: 64 MMHG | BODY MASS INDEX: 22.24 KG/M2 | WEIGHT: 112 LBS | OXYGEN SATURATION: 98 % | HEART RATE: 72 BPM | SYSTOLIC BLOOD PRESSURE: 120 MMHG

## 2023-11-30 DIAGNOSIS — E78.2 MIXED HYPERLIPIDEMIA: ICD-10-CM

## 2023-11-30 DIAGNOSIS — E11.9 TYPE 2 DIABETES MELLITUS WITHOUT COMPLICATION, WITH LONG-TERM CURRENT USE OF INSULIN (HCC): Primary | ICD-10-CM

## 2023-11-30 DIAGNOSIS — Z12.31 VISIT FOR SCREENING MAMMOGRAM: ICD-10-CM

## 2023-11-30 DIAGNOSIS — I10 PRIMARY HYPERTENSION: ICD-10-CM

## 2023-11-30 DIAGNOSIS — E83.52 HYPERCALCEMIA: ICD-10-CM

## 2023-11-30 DIAGNOSIS — M19.079 ARTHRITIS OF FOOT: ICD-10-CM

## 2023-11-30 DIAGNOSIS — L65.9 HAIR LOSS: ICD-10-CM

## 2023-11-30 DIAGNOSIS — E21.3 HYPERPARATHYROIDISM (HCC): ICD-10-CM

## 2023-11-30 DIAGNOSIS — Z79.4 TYPE 2 DIABETES MELLITUS WITHOUT COMPLICATION, WITH LONG-TERM CURRENT USE OF INSULIN (HCC): Primary | ICD-10-CM

## 2023-11-30 DIAGNOSIS — Z78.0 MENOPAUSE: ICD-10-CM

## 2023-11-30 LAB
HBA1C MFR BLD: 6 %
T4 FREE: 1.14 NG/DL (ref 0.78–2.19)
TSH REFLEX FT4: 0.1 MIU/ML (ref 0.49–4.67)

## 2023-11-30 PROCEDURE — 83036 HEMOGLOBIN GLYCOSYLATED A1C: CPT | Performed by: FAMILY MEDICINE

## 2023-11-30 PROCEDURE — 99212 OFFICE O/P EST SF 10 MIN: CPT | Performed by: FAMILY MEDICINE

## 2023-11-30 RX ORDER — DAPAGLIFLOZIN 10 MG/1
10 TABLET, FILM COATED ORAL EVERY MORNING
Qty: 90 TABLET | Refills: 3 | Status: SHIPPED | OUTPATIENT
Start: 2023-11-30

## 2023-11-30 NOTE — PROGRESS NOTES
[address] [facility]  [address] [facility], [address] [facility] [place], [address]  Dept: [phone]  Dept YIW:[phone]    Baldev Suarez is a 76 y.o. female who presents today for her medical conditions/complaints as notedbelow. Baldev Suarez is c/o of Diabetes (6 month f/u) and Hair/Scalp Problem (Pt states that she is having concerns with her hair thinning and feels that it is getting worse. )      Assessment/Plan:     1. Type 2 diabetes mellitus without complication, with long-term current use of insulin (HCC)  -     POCT glycosylated hemoglobin (Hb A1C)  -     FARXIGA 10 MG tablet; Take 1 tablet by mouth every morning, Disp-90 tablet, R-3, DAWNormal  -      DIABETES FOOT EXAM  -     [facility], DPM, Podiatry, [place]  -     Diabetic Shoe MISC; Starting Thu 11/30/2023, Disp-1 each, R-0, Print  2. Hair loss  -     TSH With Reflex Ft4; Future  3. Hypercalcemia  4. Hyperparathyroidism ([address])  -     DEXA BONE DENSITY 2 SITES; Future  5. Primary hypertension  6. Mixed hyperlipidemia  7. Menopause  -     DEXA BONE DENSITY 2 SITES; Future  8. Visit for screening mammogram  -     [facility] NATY DIGITAL SCREEN BILATERAL; Future  9. Arthritis of foot  -     Diabetic Shoe MISC; Starting Thu 11/30/2023, Disp-1 each, R-0, Print    Encouraged to continue with her regular exercise and healthy diet. HgA1c has significantly improved. Hair loss may be aggravated with her weight loss but will check the basic labs to ensure no other underlying contributing factors. Iabetes, HTN and hyperlipidemia are asx and well controlled at this time on the current medications including Ettie Putty, Encouraged to continue with her excellent exercise walking but can try the diabetic shoes to help with the chronic foot issues. With the hypercalcemia check the DEXA to ensure there is no decrease in the bone density- will contiune to monitor the hyperparathyroidism closely.           Lab Results   Component Value

## 2023-11-30 NOTE — TELEPHONE ENCOUNTER
Called [facility] and cancelled rx. Called to check status of farxiga at [facility].  Patient has automatic refill scheduled for 12/4/23

## 2023-12-01 ENCOUNTER — TELEPHONE (OUTPATIENT)
Dept: FAMILY MEDICINE CLINIC | Age: 68
End: 2023-12-01

## 2023-12-01 DIAGNOSIS — E05.90 HYPERTHYROIDISM: Primary | ICD-10-CM

## 2023-12-01 DIAGNOSIS — E21.3 HYPERPARATHYROIDISM (HCC): ICD-10-CM

## 2023-12-01 DIAGNOSIS — L65.9 HAIR LOSS: ICD-10-CM

## 2023-12-01 NOTE — TELEPHONE ENCOUNTER
Please let [facility] know her thyroid test is slightly low suggesting her thyroid functional levels are elevated. I would like her to have a thyroid USN and recheck the thyroid labs next month. Make sure not taking any OTC supplements or vitamins for 1 week prior to the labs. Orders placed.

## 2023-12-11 DIAGNOSIS — E21.3 HYPERPARATHYROIDISM (HCC): ICD-10-CM

## 2023-12-11 DIAGNOSIS — Z78.0 MENOPAUSE: ICD-10-CM

## 2023-12-14 NOTE — RESULT ENCOUNTER NOTE
Patient notified by phone. Pt states she was told to not take calcium due to her labs saying her calcium is so high. Pt states that she does drink some milk and eats some cheese for calcium. She does not have a problem taking calcium just wants to make sure it is okay with her labs.

## 2023-12-14 NOTE — RESULT ENCOUNTER NOTE
Notify there is some decrease in the bone density- FRAX score is still good idicating a relatively low risk-- make sure cotninuing with her exercise, calcium and Vitamin D.

## 2024-01-10 ENCOUNTER — OFFICE VISIT (OUTPATIENT)
Dept: PODIATRY | Age: 69
End: 2024-01-10
Payer: MEDICARE

## 2024-01-10 VITALS
RESPIRATION RATE: 20 BRPM | BODY MASS INDEX: 22.04 KG/M2 | WEIGHT: 111 LBS | SYSTOLIC BLOOD PRESSURE: 118 MMHG | HEART RATE: 68 BPM | DIASTOLIC BLOOD PRESSURE: 64 MMHG

## 2024-01-10 DIAGNOSIS — M20.40 HAMMER TOE, UNSPECIFIED LATERALITY: ICD-10-CM

## 2024-01-10 DIAGNOSIS — M21.619 BUNION OF UNSPECIFIED FOOT: ICD-10-CM

## 2024-01-10 DIAGNOSIS — E11.51 CONTROLLED TYPE 2 DM WITH PERIPHERAL CIRCULATORY DISORDER (HCC): Primary | ICD-10-CM

## 2024-01-10 DIAGNOSIS — B35.1 DERMATOPHYTOSIS OF NAIL: ICD-10-CM

## 2024-01-10 PROCEDURE — G8399 PT W/DXA RESULTS DOCUMENT: HCPCS | Performed by: PODIATRIST

## 2024-01-10 PROCEDURE — 3017F COLORECTAL CA SCREEN DOC REV: CPT | Performed by: PODIATRIST

## 2024-01-10 PROCEDURE — 2022F DILAT RTA XM EVC RTNOPTHY: CPT | Performed by: PODIATRIST

## 2024-01-10 PROCEDURE — G8427 DOCREV CUR MEDS BY ELIG CLIN: HCPCS | Performed by: PODIATRIST

## 2024-01-10 PROCEDURE — 99203 OFFICE O/P NEW LOW 30 MIN: CPT | Performed by: PODIATRIST

## 2024-01-10 PROCEDURE — 3046F HEMOGLOBIN A1C LEVEL >9.0%: CPT | Performed by: PODIATRIST

## 2024-01-10 PROCEDURE — 1123F ACP DISCUSS/DSCN MKR DOCD: CPT | Performed by: PODIATRIST

## 2024-01-10 PROCEDURE — 1036F TOBACCO NON-USER: CPT | Performed by: PODIATRIST

## 2024-01-10 PROCEDURE — 1090F PRES/ABSN URINE INCON ASSESS: CPT | Performed by: PODIATRIST

## 2024-01-10 PROCEDURE — 11721 DEBRIDE NAIL 6 OR MORE: CPT | Performed by: PODIATRIST

## 2024-01-10 PROCEDURE — G8484 FLU IMMUNIZE NO ADMIN: HCPCS | Performed by: PODIATRIST

## 2024-01-10 PROCEDURE — 3078F DIAST BP <80 MM HG: CPT | Performed by: PODIATRIST

## 2024-01-10 PROCEDURE — 3074F SYST BP LT 130 MM HG: CPT | Performed by: PODIATRIST

## 2024-01-10 PROCEDURE — G8420 CALC BMI NORM PARAMETERS: HCPCS | Performed by: PODIATRIST

## 2024-01-10 NOTE — PROGRESS NOTES
Foot Care Worksheet  PCP: Lay Hutchison MD  Last visit: 11 / 30 / 2023    Nail description: Thick , Yellow , Crumbly , Marked limitation of ambulation     Pain resulting from thickened and dystrophy of nail plate Yes    Nails involved  Right   1, 2, 3  (T5-T9)  Left     1, 2, 3  (TA-T4)    Q7 1 Class A Finding - Non traumatic amputation of foot No    Q8 2 Class B Findings - Absent DP pulse No, Absent PT pulse No, Advanced tropic changes (3 required) Yes    Decrease hair growth Yes, Nail changes/thickening Yes, Pigmented changes/discoloration Yes, Skin texture (thin, shiny) No, Skin color (rubor/redness) No    Q9 1 Class B and 2 Class C Findings  Claudication No, Temperature change Yes, Paresthesia No, Burning No, Edema Yes    Subjective:  Mary Womack is a 68 y.o. female who presents to the office today complaining of a hammertoe deformity Bilateral .  Patient a while back started walking much more extensively.  Patient started to have pain in the nails states he little more discolored at the time.  Patient is change his shoes which is really helped..  Symptoms began  month(s) ago.  Patient relates pain is Present.  Pain is rated 1 out of 10 and is described as mild.  Treatments prior to today's visit include: Change in shoes.  Diabetic control is been stable.  Currently denies F/C/N/V.     Allergies   Allergen Reactions    Metformin And Related      Diarrhea    Simvastatin      Leg cramps       Past Medical History:   Diagnosis Date    Hyperlipidemia     Hypertension     Nephrolithiasis     Type 2 diabetes mellitus without complication (HCC)        Prior to Admission medications    Medication Sig Start Date End Date Taking? Authorizing Provider   FARXIGA 10 MG tablet Take 1 tablet by mouth every morning 11/30/23  Yes Lay Hutchison MD   Diabetic Shoe MISC by Does not apply route 11/30/23  Yes Lay Hutchison MD   lisinopril (PRINIVIL;ZESTRIL) 10 MG tablet Take 1 tablet by mouth daily 8/23/23  Yes

## 2024-02-06 LAB
T3 FREE: 2.4 PG/ML (ref 2.02–4.43)
T4 FREE: 1.17 NG/DL (ref 0.78–2.19)
TSH SERPL DL<=0.05 MIU/L-ACNC: 0.44 MIU/ML (ref 0.49–4.67)

## 2024-02-08 ENCOUNTER — TELEPHONE (OUTPATIENT)
Dept: FAMILY MEDICINE CLINIC | Age: 69
End: 2024-02-08

## 2024-02-08 NOTE — TELEPHONE ENCOUNTER
Mary's TSH is slightly low but the T4 and TSH are both normal.  A ultrasound of the thyroid was to be done.  See if she has had this done.  If the thyroid ultrasound is reassuring then we would just recheck this every 6 months as long as no new signs or symptoms.

## 2024-02-12 DIAGNOSIS — I10 PRIMARY HYPERTENSION: ICD-10-CM

## 2024-02-12 RX ORDER — LISINOPRIL 10 MG/1
10 TABLET ORAL DAILY
Qty: 90 TABLET | Refills: 3 | Status: SHIPPED | OUTPATIENT
Start: 2024-02-12

## 2024-02-12 RX ORDER — AMLODIPINE BESYLATE 2.5 MG/1
2.5 TABLET ORAL DAILY
Qty: 90 TABLET | Refills: 3 | Status: SHIPPED | OUTPATIENT
Start: 2024-02-12

## 2024-02-12 NOTE — TELEPHONE ENCOUNTER
Mary Womack is requesting a refill on the following medication(s):  Requested Prescriptions     Pending Prescriptions Disp Refills    amLODIPine (NORVASC) 2.5 MG tablet 90 tablet 3     Sig: Take 1 tablet by mouth daily    lisinopril (PRINIVIL;ZESTRIL) 10 MG tablet 90 tablet 3     Sig: Take 1 tablet by mouth daily       Last Visit Date (If Applicable):  11/30/2023    Next Visit Date:    6/3/2024

## 2024-02-25 ASSESSMENT — PATIENT HEALTH QUESTIONNAIRE - PHQ9
SUM OF ALL RESPONSES TO PHQ QUESTIONS 1-9: 0
SUM OF ALL RESPONSES TO PHQ9 QUESTIONS 1 & 2: 0
SUM OF ALL RESPONSES TO PHQ9 QUESTIONS 1 & 2: 0
1. LITTLE INTEREST OR PLEASURE IN DOING THINGS: NOT AT ALL
2. FEELING DOWN, DEPRESSED OR HOPELESS: 0
SUM OF ALL RESPONSES TO PHQ QUESTIONS 1-9: 0
SUM OF ALL RESPONSES TO PHQ QUESTIONS 1-9: 0
1. LITTLE INTEREST OR PLEASURE IN DOING THINGS: 0
2. FEELING DOWN, DEPRESSED OR HOPELESS: NOT AT ALL
SUM OF ALL RESPONSES TO PHQ QUESTIONS 1-9: 0

## 2024-02-26 ENCOUNTER — OFFICE VISIT (OUTPATIENT)
Dept: FAMILY MEDICINE CLINIC | Age: 69
End: 2024-02-26
Payer: MEDICARE

## 2024-02-26 VITALS
OXYGEN SATURATION: 98 % | HEART RATE: 81 BPM | SYSTOLIC BLOOD PRESSURE: 130 MMHG | DIASTOLIC BLOOD PRESSURE: 76 MMHG | BODY MASS INDEX: 22.44 KG/M2 | WEIGHT: 113 LBS

## 2024-02-26 DIAGNOSIS — E11.9 TYPE 2 DIABETES MELLITUS WITHOUT COMPLICATION, WITH LONG-TERM CURRENT USE OF INSULIN (HCC): ICD-10-CM

## 2024-02-26 DIAGNOSIS — Z79.4 TYPE 2 DIABETES MELLITUS WITHOUT COMPLICATION, WITH LONG-TERM CURRENT USE OF INSULIN (HCC): ICD-10-CM

## 2024-02-26 DIAGNOSIS — R42 DIZZINESS: Primary | ICD-10-CM

## 2024-02-26 DIAGNOSIS — E21.3 HYPERPARATHYROIDISM (HCC): ICD-10-CM

## 2024-02-26 LAB
ANION GAP SERPL CALCULATED.3IONS-SCNC: 6.9 MMOL/L (ref 3–11)
BUN BLDV-MCNC: 27 MG/DL (ref 7–17)
CALCIUM SERPL-MCNC: 10.6 MG/DL (ref 8.4–10.2)
CHLORIDE BLD-SCNC: 106 MMOL/L (ref 98–120)
CO2: 26 MMOL/L (ref 22–31)
CREAT SERPL-MCNC: 1 MG/DL (ref 0.5–1)
GFR CALCULATED: 58.6
GLUCOSE: 111 MG/DL (ref 65–105)
POTASSIUM SERPL-SCNC: 4.3 MMOL/L (ref 3.6–5)
SODIUM BLD-SCNC: 139 MMOL/L (ref 135–145)

## 2024-02-26 PROCEDURE — 99212 OFFICE O/P EST SF 10 MIN: CPT | Performed by: FAMILY MEDICINE

## 2024-02-26 NOTE — PROGRESS NOTES
[address], [address]  [place] [id number]  Dept: [phone]  Dept Fax:[phone]    Mary Womack is a 68 y.o. female who presents today for her medical conditions/complaints as notedbelow.  Mary Womack is c/o of Dehydration (Pt states that she has been getting really bad dry mouth, dry throat, sore in mouth, and hard to swallow. Pt states that she is having a \"burning/tingly\" feeling along with a metallic taste. Pt states that this has been bothering her for about a month. Pt states that she has noticed this ever since her farxgia has been upped) and Dizziness (Pt states that the last 2-3 months she has noticed when she stands up she is dizzy. Pt states it is not all the time but the longer she sits she notices it more.)      Assessment/Plan:     1. Dizziness  Suspect partially related to decreased volume with the Farxiga.  Will try increasing the fluids and cutting the farxiga back to 5 mg daily (may cut in 1/2 until current supply is depleted.  - Basic Metabolic Panel    2. Type 2 diabetes mellitus without complication, with long-term current use of insulin (HCC)  Has been well controlled. No changes - will monitor with the lower dose.    3. Hyperparathyroidism (HCC)  Monitor the calcium.  Continue with close endocrine follow up if needed.         Lab Results   Component Value Date    WBC 9.6 06/07/2021    HGB 13.4 06/07/2021    HCT 39.3 06/07/2021    .0 06/07/2021    CHOL 184 11/20/2023    TRIG 100 11/20/2023    HDL 62 11/20/2023    ALT 22 11/20/2023    AST 37 (H) 11/20/2023     02/26/2024    K 4.3 02/26/2024     02/26/2024    CREATININE 1.0 02/26/2024    BUN 27 (H) 02/26/2024    CO2 26 02/26/2024    TSH 0.44 (L) 02/06/2024    GLUF 133 (H) 03/10/2020    LABA1C 6.0 11/30/2023    LABA1C 7.2 06/29/2023    LABA1C 8.1 03/22/2023       No follow-ups on file.      Subjective:      HPI:     HPI    Since she increased her farxiga she has

## 2024-02-27 NOTE — RESULT ENCOUNTER NOTE
Please notify patient the labs/results are normal except the calcium is slightly higher-- stable over time but slightly higher than the last visit.

## 2024-03-28 DIAGNOSIS — E78.00 PURE HYPERCHOLESTEROLEMIA: ICD-10-CM

## 2024-03-28 RX ORDER — OMEPRAZOLE 40 MG/1
CAPSULE, DELAYED RELEASE ORAL
Qty: 90 CAPSULE | Refills: 3 | Status: SHIPPED | OUTPATIENT
Start: 2024-03-28

## 2024-03-28 NOTE — TELEPHONE ENCOUNTER
Mary Womack is requesting a refill on the following medication(s):  Requested Prescriptions     Pending Prescriptions Disp Refills    omeprazole (PRILOSEC) 40 MG delayed release capsule [Pharmacy Med Name: OMEPRAZOLE CAP 40MG] 90 capsule 3     Sig: TAKE 1 CAPSULE DAILY       Last Visit Date (If Applicable):  2/26/2024    Next Visit Date:    6/3/2024

## 2024-04-03 DIAGNOSIS — E78.00 PURE HYPERCHOLESTEROLEMIA: ICD-10-CM

## 2024-04-03 RX ORDER — ROSUVASTATIN CALCIUM 10 MG/1
10 TABLET, COATED ORAL NIGHTLY
Qty: 90 TABLET | Refills: 3 | Status: SHIPPED | OUTPATIENT
Start: 2024-04-03

## 2024-04-03 NOTE — TELEPHONE ENCOUNTER
Mary Womack is requesting a refill on the following medication(s):  Requested Prescriptions     Pending Prescriptions Disp Refills    rosuvastatin (CRESTOR) 10 MG tablet 90 tablet 3     Sig: Take 1 tablet by mouth nightly       Last Visit Date (If Applicable):  2/26/2024    Next Visit Date:    6/3/2024

## 2024-05-08 ENCOUNTER — OFFICE VISIT (OUTPATIENT)
Dept: PODIATRY | Age: 69
End: 2024-05-08
Payer: MEDICARE

## 2024-05-08 VITALS
HEART RATE: 72 BPM | RESPIRATION RATE: 20 BRPM | BODY MASS INDEX: 22.6 KG/M2 | SYSTOLIC BLOOD PRESSURE: 114 MMHG | DIASTOLIC BLOOD PRESSURE: 62 MMHG | WEIGHT: 113.8 LBS

## 2024-05-08 DIAGNOSIS — E11.51 CONTROLLED TYPE 2 DM WITH PERIPHERAL CIRCULATORY DISORDER (HCC): Primary | ICD-10-CM

## 2024-05-08 DIAGNOSIS — B35.1 DERMATOPHYTOSIS OF NAIL: ICD-10-CM

## 2024-05-08 DIAGNOSIS — L84 CORNS AND CALLOSITIES: ICD-10-CM

## 2024-05-08 PROCEDURE — 11721 DEBRIDE NAIL 6 OR MORE: CPT | Performed by: PODIATRIST

## 2024-05-08 PROCEDURE — 99999 PR OFFICE/OUTPT VISIT,PROCEDURE ONLY: CPT | Performed by: PODIATRIST

## 2024-05-08 PROCEDURE — 11056 PARNG/CUTG B9 HYPRKR LES 2-4: CPT | Performed by: PODIATRIST

## 2024-05-08 NOTE — PROGRESS NOTES
of 2 benign hyperkeratotic lesions (as listed above) took place with #10 blade or tissue nippers.  Patient advised OTC methods for treatment of the mycotic nails.  Patient will follow up in 10 weeks.   Contact office with any questions/problems/concerns.

## 2024-05-31 SDOH — ECONOMIC STABILITY: FOOD INSECURITY: WITHIN THE PAST 12 MONTHS, THE FOOD YOU BOUGHT JUST DIDN'T LAST AND YOU DIDN'T HAVE MONEY TO GET MORE.: NEVER TRUE

## 2024-05-31 SDOH — ECONOMIC STABILITY: FOOD INSECURITY: WITHIN THE PAST 12 MONTHS, YOU WORRIED THAT YOUR FOOD WOULD RUN OUT BEFORE YOU GOT MONEY TO BUY MORE.: NEVER TRUE

## 2024-05-31 SDOH — HEALTH STABILITY: PHYSICAL HEALTH: ON AVERAGE, HOW MANY DAYS PER WEEK DO YOU ENGAGE IN MODERATE TO STRENUOUS EXERCISE (LIKE A BRISK WALK)?: 7 DAYS

## 2024-05-31 SDOH — ECONOMIC STABILITY: INCOME INSECURITY: HOW HARD IS IT FOR YOU TO PAY FOR THE VERY BASICS LIKE FOOD, HOUSING, MEDICAL CARE, AND HEATING?: NOT VERY HARD

## 2024-05-31 SDOH — HEALTH STABILITY: PHYSICAL HEALTH: ON AVERAGE, HOW MANY MINUTES DO YOU ENGAGE IN EXERCISE AT THIS LEVEL?: 90 MIN

## 2024-05-31 ASSESSMENT — PATIENT HEALTH QUESTIONNAIRE - PHQ9
SUM OF ALL RESPONSES TO PHQ QUESTIONS 1-9: 0
1. LITTLE INTEREST OR PLEASURE IN DOING THINGS: NOT AT ALL
SUM OF ALL RESPONSES TO PHQ QUESTIONS 1-9: 0
2. FEELING DOWN, DEPRESSED OR HOPELESS: NOT AT ALL
SUM OF ALL RESPONSES TO PHQ QUESTIONS 1-9: 0
SUM OF ALL RESPONSES TO PHQ QUESTIONS 1-9: 0
SUM OF ALL RESPONSES TO PHQ9 QUESTIONS 1 & 2: 0

## 2024-05-31 ASSESSMENT — LIFESTYLE VARIABLES
HOW MANY STANDARD DRINKS CONTAINING ALCOHOL DO YOU HAVE ON A TYPICAL DAY: 0
HOW OFTEN DO YOU HAVE A DRINK CONTAINING ALCOHOL: NEVER
HOW OFTEN DO YOU HAVE SIX OR MORE DRINKS ON ONE OCCASION: 1
HOW OFTEN DO YOU HAVE A DRINK CONTAINING ALCOHOL: 1
HOW MANY STANDARD DRINKS CONTAINING ALCOHOL DO YOU HAVE ON A TYPICAL DAY: PATIENT DOES NOT DRINK

## 2024-06-03 ENCOUNTER — OFFICE VISIT (OUTPATIENT)
Dept: FAMILY MEDICINE CLINIC | Age: 69
End: 2024-06-03
Payer: MEDICARE

## 2024-06-03 VITALS
BODY MASS INDEX: 22.58 KG/M2 | WEIGHT: 115 LBS | HEIGHT: 60 IN | DIASTOLIC BLOOD PRESSURE: 72 MMHG | SYSTOLIC BLOOD PRESSURE: 134 MMHG | OXYGEN SATURATION: 98 % | HEART RATE: 69 BPM

## 2024-06-03 DIAGNOSIS — E78.2 MIXED HYPERLIPIDEMIA: ICD-10-CM

## 2024-06-03 DIAGNOSIS — K14.6 BURNING TONGUE: ICD-10-CM

## 2024-06-03 DIAGNOSIS — I10 PRIMARY HYPERTENSION: ICD-10-CM

## 2024-06-03 DIAGNOSIS — E21.3 HYPERPARATHYROIDISM (HCC): ICD-10-CM

## 2024-06-03 DIAGNOSIS — Z51.81 MEDICATION MONITORING ENCOUNTER: ICD-10-CM

## 2024-06-03 DIAGNOSIS — E83.52 HYPERCALCEMIA: ICD-10-CM

## 2024-06-03 DIAGNOSIS — Z00.00 MEDICARE ANNUAL WELLNESS VISIT, SUBSEQUENT: Primary | ICD-10-CM

## 2024-06-03 DIAGNOSIS — Z79.4 TYPE 2 DIABETES MELLITUS WITHOUT COMPLICATION, WITH LONG-TERM CURRENT USE OF INSULIN (HCC): ICD-10-CM

## 2024-06-03 DIAGNOSIS — E11.9 TYPE 2 DIABETES MELLITUS WITHOUT COMPLICATION, WITH LONG-TERM CURRENT USE OF INSULIN (HCC): ICD-10-CM

## 2024-06-03 LAB — HBA1C MFR BLD: 6 %

## 2024-06-03 PROCEDURE — G8420 CALC BMI NORM PARAMETERS: HCPCS | Performed by: FAMILY MEDICINE

## 2024-06-03 PROCEDURE — 3017F COLORECTAL CA SCREEN DOC REV: CPT | Performed by: FAMILY MEDICINE

## 2024-06-03 PROCEDURE — G0439 PPPS, SUBSEQ VISIT: HCPCS | Performed by: FAMILY MEDICINE

## 2024-06-03 PROCEDURE — 1123F ACP DISCUSS/DSCN MKR DOCD: CPT | Performed by: FAMILY MEDICINE

## 2024-06-03 PROCEDURE — 83036 HEMOGLOBIN GLYCOSYLATED A1C: CPT | Performed by: FAMILY MEDICINE

## 2024-06-03 PROCEDURE — 3078F DIAST BP <80 MM HG: CPT | Performed by: FAMILY MEDICINE

## 2024-06-03 PROCEDURE — 2022F DILAT RTA XM EVC RTNOPTHY: CPT | Performed by: FAMILY MEDICINE

## 2024-06-03 PROCEDURE — 3075F SYST BP GE 130 - 139MM HG: CPT | Performed by: FAMILY MEDICINE

## 2024-06-03 PROCEDURE — 99213 OFFICE O/P EST LOW 20 MIN: CPT | Performed by: FAMILY MEDICINE

## 2024-06-03 PROCEDURE — 1090F PRES/ABSN URINE INCON ASSESS: CPT | Performed by: FAMILY MEDICINE

## 2024-06-03 PROCEDURE — G8427 DOCREV CUR MEDS BY ELIG CLIN: HCPCS | Performed by: FAMILY MEDICINE

## 2024-06-03 PROCEDURE — G8399 PT W/DXA RESULTS DOCUMENT: HCPCS | Performed by: FAMILY MEDICINE

## 2024-06-03 PROCEDURE — 1036F TOBACCO NON-USER: CPT | Performed by: FAMILY MEDICINE

## 2024-06-03 PROCEDURE — 3044F HG A1C LEVEL LT 7.0%: CPT | Performed by: FAMILY MEDICINE

## 2024-06-03 PROCEDURE — PBSHW POCT GLYCOSYLATED HEMOGLOBIN (HGB A1C): Performed by: FAMILY MEDICINE

## 2024-06-03 NOTE — PROGRESS NOTES
Medicare Annual Wellness Visit    Mary Womack is here for Medicare AWV    Assessment & Plan   Medicare annual wellness visit, subsequent  Type 2 diabetes mellitus without complication, with long-term current use of insulin (HCC)  -     POCT glycosylated hemoglobin (Hb A1C)  -     Comprehensive Metabolic Panel; Future  -     Microalbumin, Ur; Future  Mixed hyperlipidemia  -     Lipid Panel; Future  Primary hypertension  -     Comprehensive Metabolic Panel; Future  Hyperparathyroidism (HCC)  Hypercalcemia  Medication monitoring encounter  -     Magnesium; Future  -     Vitamin B12; Future  Burning tongue    Recommendations for Preventive Services Due: see orders and patient instructions/AVS.    Can try holding the amlodipine and see if this helps with the burning tongue and dry mouth-- try capsacian (tabasco sauce) a drop or two in 8 ounces water to swish and see if this helps.     Monitor the BP regularly.  Currently hypertension hyperlipidemia and hypercalcemia are well-controlled.  Monitor over the next 6 months.    Will check the labs in the fall. Consider iron if not improving.     Recommended screening schedule for the next 5-10 years is provided to the patient in written form: see Patient Instructions/AVS.     Return in about 6 months (around 12/3/2024) for Medication recheck.     Subjective   The following acute and/or chronic problems were also addressed today:  Mary Womack is a 68 y.o. female who presents for follow-up of hypertension, diabetes, and hyperlipidemia. She indicates that she is feeling well and denies any symptoms referable to her elevated blood pressure or diabetes.   Specifically denies chest pain, palpitations, dyspnea, peripheral edema, thirst, frequent urination, and blurred vision. Current medication regimen is as listed below. She denies any side effects of medication, and has been taking it regularly. Home glucose readings have been really good - usually in the lower 100-.

## 2024-08-29 DIAGNOSIS — E11.9 TYPE 2 DIABETES MELLITUS WITHOUT COMPLICATION, WITH LONG-TERM CURRENT USE OF INSULIN (HCC): ICD-10-CM

## 2024-08-29 DIAGNOSIS — Z79.4 TYPE 2 DIABETES MELLITUS WITHOUT COMPLICATION, WITH LONG-TERM CURRENT USE OF INSULIN (HCC): ICD-10-CM

## 2024-09-03 RX ORDER — EXENATIDE 2 MG/.85ML
2 INJECTION, SUSPENSION, EXTENDED RELEASE SUBCUTANEOUS WEEKLY
Qty: 12 ADJUSTABLE DOSE PRE-FILLED PEN SYRINGE | Refills: 3 | Status: SHIPPED | OUTPATIENT
Start: 2024-09-03

## 2024-09-03 NOTE — TELEPHONE ENCOUNTER
Mary Womack is requesting a refill on the following medication(s):  Requested Prescriptions     Pending Prescriptions Disp Refills    Exenatide ER (BYDUREON BCISE) 2 MG/0.85ML injection 12 Adjustable Dose Pre-filled Pen Syringe 3     Sig: Inject 0.85 mLs into the skin once a week       Last Visit Date (If Applicable):  6/3/2024    Next Visit Date:    12/4/2024

## 2024-09-25 ENCOUNTER — OFFICE VISIT (OUTPATIENT)
Dept: PODIATRY | Age: 69
End: 2024-09-25
Payer: MEDICARE

## 2024-09-25 VITALS
WEIGHT: 114 LBS | DIASTOLIC BLOOD PRESSURE: 70 MMHG | HEIGHT: 60 IN | HEART RATE: 66 BPM | BODY MASS INDEX: 22.38 KG/M2 | SYSTOLIC BLOOD PRESSURE: 118 MMHG

## 2024-09-25 DIAGNOSIS — E11.51 CONTROLLED TYPE 2 DM WITH PERIPHERAL CIRCULATORY DISORDER (HCC): Primary | ICD-10-CM

## 2024-09-25 DIAGNOSIS — B35.1 DERMATOPHYTOSIS OF NAIL: ICD-10-CM

## 2024-09-25 DIAGNOSIS — L84 CORNS AND CALLOSITIES: ICD-10-CM

## 2024-09-25 PROCEDURE — 11056 PARNG/CUTG B9 HYPRKR LES 2-4: CPT | Performed by: PODIATRIST

## 2024-09-25 PROCEDURE — 99999 PR OFFICE/OUTPT VISIT,PROCEDURE ONLY: CPT | Performed by: PODIATRIST

## 2024-09-25 PROCEDURE — 11721 DEBRIDE NAIL 6 OR MORE: CPT | Performed by: PODIATRIST

## 2024-12-02 LAB
ALBUMIN/GLOBULIN RATIO: 1.8 G/DL
ALBUMIN: 4.6 G/DL (ref 3.5–5)
ALP BLD-CCNC: 62 UNITS/L (ref 38–126)
ALT SERPL-CCNC: 21 UNITS/L (ref 4–35)
ANION GAP SERPL CALCULATED.3IONS-SCNC: 4.4 MMOL/L (ref 3–11)
AST SERPL-CCNC: 33 UNITS/L (ref 14–36)
BILIRUB SERPL-MCNC: 0.6 MG/DL (ref 0.2–1.3)
BUN BLDV-MCNC: 24 MG/DL (ref 7–17)
CALCIUM SERPL-MCNC: 10.4 MG/DL (ref 8.4–10.2)
CHLORIDE BLD-SCNC: 105 MMOL/L (ref 98–120)
CHOLESTEROL, TOTAL: 141 MG/DL (ref 50–200)
CHOLESTEROL/HDL RATIO: 2 RATIO (ref 0–4.5)
CO2: 27 MMOL/L (ref 22–31)
CREAT SERPL-MCNC: 1 MG/DL (ref 0.5–1)
GFR, ESTIMATED: 58.4
GLOBULIN: 2.6 G/DL
GLUCOSE: 106 MG/DL (ref 65–105)
HDLC SERPL-MCNC: 57 MG/DL (ref 36–68)
LDL CHOLESTEROL: 56.4 MG/DL (ref 0–160)
MAGNESIUM: 1.6 MG/DL (ref 1.6–2.3)
POTASSIUM SERPL-SCNC: 4.7 MMOL/L (ref 3.6–5)
SODIUM BLD-SCNC: 136 MMOL/L (ref 135–145)
TOTAL PROTEIN: 7.2 G/DL (ref 6.3–8.2)
TRIGL SERPL-MCNC: 138 MG/DL (ref 10–250)
VITAMIN B-12: 795 PG/ML (ref 239–931)
VLDLC SERPL CALC-MCNC: 28 MG/DL (ref 0–50)

## 2024-12-04 ENCOUNTER — OFFICE VISIT (OUTPATIENT)
Dept: FAMILY MEDICINE CLINIC | Age: 69
End: 2024-12-04
Payer: MEDICARE

## 2024-12-04 VITALS
DIASTOLIC BLOOD PRESSURE: 70 MMHG | HEART RATE: 67 BPM | WEIGHT: 116 LBS | SYSTOLIC BLOOD PRESSURE: 124 MMHG | BODY MASS INDEX: 23.04 KG/M2 | OXYGEN SATURATION: 99 %

## 2024-12-04 DIAGNOSIS — E21.3 HYPERPARATHYROIDISM (HCC): ICD-10-CM

## 2024-12-04 DIAGNOSIS — K14.6 BURNING TONGUE: ICD-10-CM

## 2024-12-04 DIAGNOSIS — E78.2 MIXED HYPERLIPIDEMIA: ICD-10-CM

## 2024-12-04 DIAGNOSIS — E11.9 TYPE 2 DIABETES MELLITUS WITHOUT COMPLICATION, WITH LONG-TERM CURRENT USE OF INSULIN (HCC): Primary | ICD-10-CM

## 2024-12-04 DIAGNOSIS — E83.52 HYPERCALCEMIA: ICD-10-CM

## 2024-12-04 DIAGNOSIS — I10 PRIMARY HYPERTENSION: ICD-10-CM

## 2024-12-04 DIAGNOSIS — Z23 NEEDS FLU SHOT: ICD-10-CM

## 2024-12-04 DIAGNOSIS — Z79.4 TYPE 2 DIABETES MELLITUS WITHOUT COMPLICATION, WITH LONG-TERM CURRENT USE OF INSULIN (HCC): Primary | ICD-10-CM

## 2024-12-04 LAB
CREATININE, RANDOM URINE: 90.4 MG/DL (ref 20–370)
HBA1C MFR BLD: 6.1 %
MICROALBUMIN/CREAT 24H UR: 1.1 MG/DL (ref 0–1.7)
MICROALBUMIN/CREAT UR-RTO: 12.16

## 2024-12-04 PROCEDURE — 83036 HEMOGLOBIN GLYCOSYLATED A1C: CPT | Performed by: FAMILY MEDICINE

## 2024-12-04 PROCEDURE — 99212 OFFICE O/P EST SF 10 MIN: CPT | Performed by: FAMILY MEDICINE

## 2024-12-04 PROCEDURE — 90653 IIV ADJUVANT VACCINE IM: CPT | Performed by: FAMILY MEDICINE

## 2024-12-04 NOTE — PROGRESS NOTES
Have you had an allergic reaction to the flu (influenza) shot? no  Are you allergic to eggs or any component of the flu vaccine? no  Do you have a history of Guillain-Johnson Syndrome (GBS), which is paralysis after receiving the flu vaccine? no  Are you feeling well today? yes  Flu vaccine given as ordered.  Patient tolerated it well.  No questions re: VIS information.    After obtaining consent, and per orders of Lay Hutchison MD, injection of FLU VACCINE given in Right deltoid by Tosin Cruz MA. Patient tolerated well.  Patient instructed to remain in clinic for 20 minutes afterwards, and to report any adverse reaction immediately.

## 2024-12-04 NOTE — PATIENT INSTRUCTIONS
Dissolve 5-6 drops of Tabasco sauce in 1 teaspoon of water and rinse your mouth with it four times a day. The initial feeling is one of burning but within a few minutes, some patients report that the overall burning is much reduced.

## 2024-12-04 NOTE — PROGRESS NOTES
[place] [address] [address]  [place] [address]  Dept: [phone]  Dept Fax:[phone]    Mary Womack is a 69 y.o. female who presents today for her medical conditions/complaints as notedbelow.        Assessment/Plan:     Assessment & Plan  1. Diabetes Mellitus.  Her A1c remains commendable at 6.1, and her weight is stable at 116 lbs. The urine test for kidney function is satisfactory, with a ratio of 12.16, indicating no excessive protein spillage. B12 levels are within the normal range at 795. Cholesterol levels are optimal with a total of 141, LDL at 56.4, and HDL at 57. The cholesterol ratio is excellent at 2. Morning blood sugar was 106, slightly above the preferred level of 100. Calcium levels are slightly elevated, and BUN is marginally high, likely due to fasting. Magnesium is slightly low at 1.6. She was advised to continue monitoring her weight and maintain her exercise regimen. Alternative medications such as Mounjaro, Trulicity, or Ozempic were suggested. Will try at this time to go without the GLP-1 after the current supply is exhausted and see how she is doing.  A DEXA scan will be ordered. She was encouraged to stay hydrated and consider joining a gym.    2. Burning Mouth Syndrome.  A trial of zinc supplements for a month was recommended. If ineffective, she was advised to discontinue use. An alternative remedy of 5 to 6 drops of Tabasco sauce in 1 teaspoon of water, swished twice daily, was suggested.    Follow-up  Patient will return in 6 months for a wellness visit.      Assessment & Plan  Type 2 diabetes mellitus without complication, with long-term current use of insulin (HCC)       Orders:    POCT glycosylated hemoglobin (Hb A1C)    Needs flu shot      Orders:    Influenza, FLUAD Trivalent, (age 65 y+), IM, Preservative Free, 0.5mL    Primary hypertension    Hyperparathyroidism (HCC)    Hypercalcemia    Burning tongue      Mixed

## 2024-12-08 PROBLEM — K14.6 BURNING TONGUE: Status: ACTIVE | Noted: 2024-12-08

## 2025-01-02 DIAGNOSIS — E11.9 TYPE 2 DIABETES MELLITUS WITHOUT COMPLICATION, WITH LONG-TERM CURRENT USE OF INSULIN (HCC): ICD-10-CM

## 2025-01-02 DIAGNOSIS — Z79.4 TYPE 2 DIABETES MELLITUS WITHOUT COMPLICATION, WITH LONG-TERM CURRENT USE OF INSULIN (HCC): ICD-10-CM

## 2025-01-02 NOTE — TELEPHONE ENCOUNTER
Mary Womack is requesting a refill on the following medication(s):  Requested Prescriptions     Pending Prescriptions Disp Refills    blood glucose monitor strips 100 strip 3     Sig: Test 1-2  times a day & as needed for symptoms of irregular blood glucose. Dispense sufficient amount for indicated testing frequency plus additional to accommodate PRN testing needs.       Last Visit Date (If Applicable):  12/4/2024    Next Visit Date:    6/5/2025

## 2025-01-03 RX ORDER — GLUCOSAMINE HCL/CHONDROITIN SU 500-400 MG
CAPSULE ORAL
Qty: 100 STRIP | Refills: 3 | Status: SHIPPED | OUTPATIENT
Start: 2025-01-03

## 2025-01-06 DIAGNOSIS — E11.9 TYPE 2 DIABETES MELLITUS WITHOUT COMPLICATION, WITH LONG-TERM CURRENT USE OF INSULIN (HCC): ICD-10-CM

## 2025-01-06 DIAGNOSIS — Z79.4 TYPE 2 DIABETES MELLITUS WITHOUT COMPLICATION, WITH LONG-TERM CURRENT USE OF INSULIN (HCC): ICD-10-CM

## 2025-01-06 RX ORDER — EXENATIDE 2 MG/.85ML
2 INJECTION, SUSPENSION, EXTENDED RELEASE SUBCUTANEOUS WEEKLY
Qty: 12 ADJUSTABLE DOSE PRE-FILLED PEN SYRINGE | Refills: 3 | Status: SHIPPED | OUTPATIENT
Start: 2025-01-06

## 2025-01-06 RX ORDER — DAPAGLIFLOZIN 10 MG/1
10 TABLET, FILM COATED ORAL EVERY MORNING
Qty: 90 TABLET | Refills: 3 | Status: SHIPPED | OUTPATIENT
Start: 2025-01-06

## 2025-01-09 DIAGNOSIS — Z79.4 TYPE 2 DIABETES MELLITUS WITHOUT COMPLICATION, WITH LONG-TERM CURRENT USE OF INSULIN (HCC): ICD-10-CM

## 2025-01-09 DIAGNOSIS — E11.9 TYPE 2 DIABETES MELLITUS WITHOUT COMPLICATION, WITH LONG-TERM CURRENT USE OF INSULIN (HCC): ICD-10-CM

## 2025-01-09 NOTE — TELEPHONE ENCOUNTER
Mary Womack is requesting a refill on the following medication(s):  Requested Prescriptions     Pending Prescriptions Disp Refills    blood glucose monitor strips 200 strip 3     Sig: Test 1-2  times a day       Last Visit Date (If Applicable):  12/4/2024    Next Visit Date:    6/5/2025

## 2025-01-10 RX ORDER — GLUCOSAMINE HCL/CHONDROITIN SU 500-400 MG
CAPSULE ORAL
Qty: 200 STRIP | Refills: 3 | Status: SHIPPED | OUTPATIENT
Start: 2025-01-10

## 2025-01-23 DIAGNOSIS — E11.9 TYPE 2 DIABETES MELLITUS WITHOUT COMPLICATION, WITH LONG-TERM CURRENT USE OF INSULIN (HCC): ICD-10-CM

## 2025-01-23 DIAGNOSIS — Z79.4 TYPE 2 DIABETES MELLITUS WITHOUT COMPLICATION, WITH LONG-TERM CURRENT USE OF INSULIN (HCC): ICD-10-CM

## 2025-01-23 RX ORDER — GLUCOSAMINE HCL/CHONDROITIN SU 500-400 MG
CAPSULE ORAL
Qty: 100 STRIP | Refills: 3 | Status: SHIPPED | OUTPATIENT
Start: 2025-01-23

## 2025-01-23 NOTE — TELEPHONE ENCOUNTER
Need new rx- medicare will not allow 1-2 times a day. Must be once daily or twice daily with CMN completed.

## 2025-02-20 DIAGNOSIS — I10 PRIMARY HYPERTENSION: ICD-10-CM

## 2025-02-21 RX ORDER — LISINOPRIL 10 MG/1
10 TABLET ORAL DAILY
Qty: 90 TABLET | Refills: 3 | Status: SHIPPED | OUTPATIENT
Start: 2025-02-21

## 2025-02-21 RX ORDER — LISINOPRIL 10 MG/1
10 TABLET ORAL DAILY
Qty: 90 TABLET | Refills: 3 | Status: SHIPPED | OUTPATIENT
Start: 2025-02-21 | End: 2025-02-21

## 2025-02-21 RX ORDER — AMLODIPINE BESYLATE 2.5 MG/1
2.5 TABLET ORAL DAILY
Qty: 90 TABLET | Refills: 3 | Status: SHIPPED | OUTPATIENT
Start: 2025-02-21

## 2025-02-21 RX ORDER — AMLODIPINE BESYLATE 2.5 MG/1
2.5 TABLET ORAL DAILY
Qty: 90 TABLET | Refills: 3 | Status: SHIPPED | OUTPATIENT
Start: 2025-02-21 | End: 2025-02-21

## 2025-02-21 NOTE — TELEPHONE ENCOUNTER
Mary Womack is requesting a refill on the following medication(s):  Requested Prescriptions     Pending Prescriptions Disp Refills    amLODIPine (NORVASC) 2.5 MG tablet 90 tablet 3     Sig: Take 1 tablet by mouth daily    lisinopril (PRINIVIL;ZESTRIL) 10 MG tablet 90 tablet 3     Sig: Take 1 tablet by mouth daily       Last Visit Date (If Applicable):  12/4/2024    Next Visit Date:    6/5/2025

## 2025-02-21 NOTE — TELEPHONE ENCOUNTER
Please send to new pharmacy    Mary Womack is requesting a refill on the following medication(s):  Requested Prescriptions     Signed Prescriptions Disp Refills    amLODIPine (NORVASC) 2.5 MG tablet 90 tablet 3     Sig: Take 1 tablet by mouth daily     Authorizing Provider: SHEN SHABAZZ    lisinopril (PRINIVIL;ZESTRIL) 10 MG tablet 90 tablet 3     Sig: Take 1 tablet by mouth daily     Authorizing Provider: SHEN SHABAZZ       Last Visit Date (If Applicable):  12/4/2024    Next Visit Date:    6/5/2025

## 2025-04-22 DIAGNOSIS — E78.00 PURE HYPERCHOLESTEROLEMIA: ICD-10-CM

## 2025-04-22 RX ORDER — ROSUVASTATIN CALCIUM 10 MG/1
10 TABLET, COATED ORAL NIGHTLY
Qty: 90 TABLET | Refills: 3 | Status: SHIPPED | OUTPATIENT
Start: 2025-04-22

## 2025-04-22 NOTE — TELEPHONE ENCOUNTER
Mary Womack is requesting a refill on the following medication(s):  Requested Prescriptions     Pending Prescriptions Disp Refills    rosuvastatin (CRESTOR) 10 MG tablet 90 tablet 3     Sig: Take 1 tablet by mouth nightly       Last Visit Date (If Applicable):  12/4/2024    Next Visit Date:    6/5/2025

## 2025-05-15 DIAGNOSIS — E78.00 PURE HYPERCHOLESTEROLEMIA: ICD-10-CM

## 2025-05-16 RX ORDER — OMEPRAZOLE 40 MG/1
CAPSULE, DELAYED RELEASE ORAL
Qty: 90 CAPSULE | Refills: 3 | Status: SHIPPED | OUTPATIENT
Start: 2025-05-16

## 2025-05-16 NOTE — TELEPHONE ENCOUNTER
Mary Womack is requesting a refill on the following medication(s):  Requested Prescriptions     Pending Prescriptions Disp Refills    omeprazole (PRILOSEC) 40 MG delayed release capsule 90 capsule 3     Sig: TAKE 1 CAPSULE DAILY       Last Visit Date (If Applicable):  12/4/2024    Next Visit Date:    6/5/2025

## 2025-06-17 SDOH — ECONOMIC STABILITY: FOOD INSECURITY: WITHIN THE PAST 12 MONTHS, YOU WORRIED THAT YOUR FOOD WOULD RUN OUT BEFORE YOU GOT MONEY TO BUY MORE.: NEVER TRUE

## 2025-06-17 SDOH — ECONOMIC STABILITY: FOOD INSECURITY: WITHIN THE PAST 12 MONTHS, THE FOOD YOU BOUGHT JUST DIDN'T LAST AND YOU DIDN'T HAVE MONEY TO GET MORE.: NEVER TRUE

## 2025-06-17 SDOH — ECONOMIC STABILITY: INCOME INSECURITY: IN THE LAST 12 MONTHS, WAS THERE A TIME WHEN YOU WERE NOT ABLE TO PAY THE MORTGAGE OR RENT ON TIME?: NO

## 2025-06-17 SDOH — HEALTH STABILITY: PHYSICAL HEALTH: ON AVERAGE, HOW MANY MINUTES DO YOU ENGAGE IN EXERCISE AT THIS LEVEL?: 90 MIN

## 2025-06-17 SDOH — HEALTH STABILITY: PHYSICAL HEALTH: ON AVERAGE, HOW MANY DAYS PER WEEK DO YOU ENGAGE IN MODERATE TO STRENUOUS EXERCISE (LIKE A BRISK WALK)?: 7 DAYS

## 2025-06-17 ASSESSMENT — LIFESTYLE VARIABLES
HOW OFTEN DO YOU HAVE A DRINK CONTAINING ALCOHOL: 2
HOW MANY STANDARD DRINKS CONTAINING ALCOHOL DO YOU HAVE ON A TYPICAL DAY: 1 OR 2
HOW MANY STANDARD DRINKS CONTAINING ALCOHOL DO YOU HAVE ON A TYPICAL DAY: 1
HOW OFTEN DO YOU HAVE A DRINK CONTAINING ALCOHOL: MONTHLY OR LESS
HOW OFTEN DO YOU HAVE SIX OR MORE DRINKS ON ONE OCCASION: 1

## 2025-06-17 ASSESSMENT — PATIENT HEALTH QUESTIONNAIRE - PHQ9
SUM OF ALL RESPONSES TO PHQ QUESTIONS 1-9: 0
SUM OF ALL RESPONSES TO PHQ QUESTIONS 1-9: 0
2. FEELING DOWN, DEPRESSED OR HOPELESS: NOT AT ALL
SUM OF ALL RESPONSES TO PHQ QUESTIONS 1-9: 0
1. LITTLE INTEREST OR PLEASURE IN DOING THINGS: NOT AT ALL
SUM OF ALL RESPONSES TO PHQ QUESTIONS 1-9: 0

## 2025-06-19 ENCOUNTER — OFFICE VISIT (OUTPATIENT)
Dept: FAMILY MEDICINE CLINIC | Age: 70
End: 2025-06-19
Payer: MEDICARE

## 2025-06-19 VITALS
HEART RATE: 64 BPM | HEIGHT: 60 IN | OXYGEN SATURATION: 97 % | WEIGHT: 113 LBS | BODY MASS INDEX: 22.19 KG/M2 | SYSTOLIC BLOOD PRESSURE: 124 MMHG | DIASTOLIC BLOOD PRESSURE: 62 MMHG

## 2025-06-19 DIAGNOSIS — Z79.4 TYPE 2 DIABETES MELLITUS WITHOUT COMPLICATION, WITH LONG-TERM CURRENT USE OF INSULIN (HCC): ICD-10-CM

## 2025-06-19 DIAGNOSIS — Z00.00 MEDICARE ANNUAL WELLNESS VISIT, SUBSEQUENT: Primary | ICD-10-CM

## 2025-06-19 DIAGNOSIS — Z12.31 VISIT FOR SCREENING MAMMOGRAM: ICD-10-CM

## 2025-06-19 DIAGNOSIS — I10 PRIMARY HYPERTENSION: ICD-10-CM

## 2025-06-19 DIAGNOSIS — Z51.81 MEDICATION MONITORING ENCOUNTER: ICD-10-CM

## 2025-06-19 DIAGNOSIS — E21.3 HYPERPARATHYROIDISM: ICD-10-CM

## 2025-06-19 DIAGNOSIS — E78.2 MIXED HYPERLIPIDEMIA: ICD-10-CM

## 2025-06-19 DIAGNOSIS — E83.52 HYPERCALCEMIA: ICD-10-CM

## 2025-06-19 DIAGNOSIS — E11.9 TYPE 2 DIABETES MELLITUS WITHOUT COMPLICATION, WITH LONG-TERM CURRENT USE OF INSULIN (HCC): ICD-10-CM

## 2025-06-19 LAB — HBA1C MFR BLD: 6.2 %

## 2025-06-19 PROCEDURE — 83036 HEMOGLOBIN GLYCOSYLATED A1C: CPT | Performed by: FAMILY MEDICINE

## 2025-06-19 NOTE — PROGRESS NOTES
Medicare Annual Wellness Visit    Mary Womack is here for Medicare AWV    Assessment & Plan  1. Diabetes Mellitus.  - Blood glucose levels are well-controlled, with morning readings <100 and evening readings <200.  - Weight decreased to 113 pounds, maintained through diet and physical activity.  - Mammogram ordered, can be scheduled at convenience; eye exam scheduled for 07/2025 or 08/2025.  - Influenza vaccine advised for fall.    Follow-up  The patient will follow up in 6 months.      Medicare annual wellness visit, subsequent  Type 2 diabetes mellitus without complication, with long-term current use of insulin (HCC)  -     POCT glycosylated hemoglobin (Hb A1C)  Mixed hyperlipidemia  -     Lipid Panel; Future  Hypercalcemia  Hyperparathyroidism  Primary hypertension  -     Magnesium; Future  -     CBC with Auto Differential; Future  Medication monitoring encounter  -     Comprehensive Metabolic Panel; Future  -     Vitamin B12; Future  Visit for screening mammogram  -     CHAD NATY DIGITAL SCREEN BILATERAL; Future    HTN, hyperlipidemia asx and well controlled.  Labs ordered.  Hyperparathyroidism asx- would check the labs at this time.   Results  Labs   - Morning blood sugar: Under 100   - Night blood sugar: Under 200   - A1c: 6.1 to 6.2     No follow-ups on file.     Subjective     History of Present Illness  The patient presents for evaluation of diabetes and health maintenance.    She reports satisfactory blood glucose levels, with morning readings consistently below 100 and evening readings typically under 200. This is achieved without the use of Bydureon for the past 6 months. She has been vigilant in monitoring her diet and maintaining physical activity, even during the winter months, ensuring she achieves her daily step count. She is not experiencing any chest pain, shortness of breath, or dizziness. Her last eye examination was conducted in 2024, and she has an upcoming appointment scheduled for July or

## 2025-06-27 ENCOUNTER — OFFICE VISIT (OUTPATIENT)
Dept: FAMILY MEDICINE CLINIC | Age: 70
End: 2025-06-27

## 2025-06-27 VITALS
OXYGEN SATURATION: 97 % | WEIGHT: 112.4 LBS | SYSTOLIC BLOOD PRESSURE: 130 MMHG | DIASTOLIC BLOOD PRESSURE: 66 MMHG | HEART RATE: 61 BPM | BODY MASS INDEX: 22.32 KG/M2
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
## 2025-06-27 DIAGNOSIS — S01.01XA LACERATION OF SCALP, INITIAL ENCOUNTER: ICD-10-CM

## 2025-06-27 NOTE — PROGRESS NOTES
[place] [address]  [place], [place] [address]  Dept: [phone]  Dept Fax:[phone]    Mary Womack is a 69 y.o. female who presents today for her medical conditions/complaints as notedbelow.        Assessment/Plan:     Assessment & Plan  1. Head injury.  - The wound has healed well, and the staples were removed during this visit.  - No nausea, vomiting, or headaches reported since the incident.  - Advised to gently apply shampoo to the affected area while washing hair to facilitate the removal of the remaining scab.  - No further complications noted; patient was reassured about the healing process.    2. Scalp lesion.  - A scrape was noted on the top of the head, located on one of the moles.  - The lesion has a scab, making it difficult to determine if it is just irritated or an early skin cancer.  - Advised to apply Vaseline to the area and monitor it over the next couple of months.  - If the area remains chronically irritated and inflamed, further evaluation will be necessary to rule out skin cancer.    PROCEDURE  Procedure: Staples removal from head wound    All questions were answered and agreement to proceed was given after the following Pre-Procedure details were reviewed:  - Consent: Verbal consent obtained for staples removal    Intra-Procedure:    Post-Procedure:  - Tolerance Level: Patient tolerated the procedure well  - Complications: None  - Home Care Instructions: Patient advised to gently work a little bit of shampoo onto the wound area when washing hair, but to be very gentle to avoid disturbing the healing process      Assessment & Plan  Fall from ladder, initial encounter     Laceration of scalp, initial encounter          Results      Lab Results   Component Value Date    WBC 9.6 06/07/2021    HGB 13.4 06/07/2021    HCT 39.3 06/07/2021    .0 06/07/2021    CHOL 141 12/02/2024    TRIG 138 12/02/2024    HDL 57 12/02/2024    ALT 21

## 2025-08-25 DIAGNOSIS — Z79.4 TYPE 2 DIABETES MELLITUS WITHOUT COMPLICATION, WITH LONG-TERM CURRENT USE OF INSULIN (HCC): ICD-10-CM

## 2025-08-25 DIAGNOSIS — E11.9 TYPE 2 DIABETES MELLITUS WITHOUT COMPLICATION, WITH LONG-TERM CURRENT USE OF INSULIN (HCC): ICD-10-CM

## 2025-08-25 RX ORDER — DAPAGLIFLOZIN 10 MG/1
10 TABLET, FILM COATED ORAL EVERY MORNING
Qty: 90 TABLET | Refills: 3 | Status: SHIPPED | OUTPATIENT
Start: 2025-08-25